# Patient Record
Sex: FEMALE | Race: WHITE | URBAN - METROPOLITAN AREA
[De-identification: names, ages, dates, MRNs, and addresses within clinical notes are randomized per-mention and may not be internally consistent; named-entity substitution may affect disease eponyms.]

---

## 2018-03-13 ENCOUNTER — OUTPATIENT (OUTPATIENT)
Dept: OUTPATIENT SERVICES | Facility: HOSPITAL | Age: 53
LOS: 1 days | Discharge: HOME | End: 2018-03-13

## 2018-03-13 DIAGNOSIS — Z12.31 ENCOUNTER FOR SCREENING MAMMOGRAM FOR MALIGNANT NEOPLASM OF BREAST: ICD-10-CM

## 2019-06-12 PROBLEM — Z00.00 ENCOUNTER FOR PREVENTIVE HEALTH EXAMINATION: Status: ACTIVE | Noted: 2019-06-12

## 2019-06-27 ENCOUNTER — RECORD ABSTRACTING (OUTPATIENT)
Age: 54
End: 2019-06-27

## 2019-06-27 ENCOUNTER — APPOINTMENT (OUTPATIENT)
Dept: OBGYN | Facility: CLINIC | Age: 54
End: 2019-06-27
Payer: COMMERCIAL

## 2019-06-27 VITALS
DIASTOLIC BLOOD PRESSURE: 80 MMHG | SYSTOLIC BLOOD PRESSURE: 130 MMHG | HEIGHT: 62 IN | WEIGHT: 160 LBS | BODY MASS INDEX: 29.44 KG/M2

## 2019-06-27 DIAGNOSIS — Z80.3 FAMILY HISTORY OF MALIGNANT NEOPLASM OF BREAST: ICD-10-CM

## 2019-06-27 DIAGNOSIS — R23.2 FLUSHING: ICD-10-CM

## 2019-06-27 DIAGNOSIS — Z87.42 PERSONAL HISTORY OF OTHER DISEASES OF THE FEMALE GENITAL TRACT: ICD-10-CM

## 2019-06-27 DIAGNOSIS — Z78.9 OTHER SPECIFIED HEALTH STATUS: ICD-10-CM

## 2019-06-27 DIAGNOSIS — N95.1 MENOPAUSAL AND FEMALE CLIMACTERIC STATES: ICD-10-CM

## 2019-06-27 LAB — CYTOLOGY CVX/VAG DOC THIN PREP: NORMAL

## 2019-06-27 PROCEDURE — 99396 PREV VISIT EST AGE 40-64: CPT | Mod: 25

## 2019-06-27 PROCEDURE — 99213 OFFICE O/P EST LOW 20 MIN: CPT

## 2019-07-03 LAB — CYTOLOGY CVX/VAG DOC THIN PREP: NORMAL

## 2019-07-17 ENCOUNTER — FORM ENCOUNTER (OUTPATIENT)
Age: 54
End: 2019-07-17

## 2019-07-18 ENCOUNTER — OUTPATIENT (OUTPATIENT)
Dept: OUTPATIENT SERVICES | Facility: HOSPITAL | Age: 54
LOS: 1 days | Discharge: HOME | End: 2019-07-18
Payer: COMMERCIAL

## 2019-07-18 DIAGNOSIS — R92.8 OTHER ABNORMAL AND INCONCLUSIVE FINDINGS ON DIAGNOSTIC IMAGING OF BREAST: ICD-10-CM

## 2019-07-18 PROCEDURE — G0279: CPT | Mod: 26

## 2019-07-18 PROCEDURE — 77066 DX MAMMO INCL CAD BI: CPT | Mod: 26

## 2019-07-18 PROCEDURE — 76642 ULTRASOUND BREAST LIMITED: CPT | Mod: 26,RT

## 2019-07-26 ENCOUNTER — APPOINTMENT (OUTPATIENT)
Dept: OBGYN | Facility: CLINIC | Age: 54
End: 2019-07-26
Payer: COMMERCIAL

## 2019-07-26 VITALS — DIASTOLIC BLOOD PRESSURE: 82 MMHG | SYSTOLIC BLOOD PRESSURE: 130 MMHG | WEIGHT: 160 LBS | BODY MASS INDEX: 29.26 KG/M2

## 2019-07-26 DIAGNOSIS — R97.0 ELEVATED CARCINOEMBRYONIC ANTIGEN [CEA]: ICD-10-CM

## 2019-07-26 DIAGNOSIS — Z86.018 PERSONAL HISTORY OF OTHER BENIGN NEOPLASM: ICD-10-CM

## 2019-07-26 PROCEDURE — 99213 OFFICE O/P EST LOW 20 MIN: CPT

## 2019-08-02 ENCOUNTER — APPOINTMENT (OUTPATIENT)
Dept: GYNECOLOGIC ONCOLOGY | Facility: CLINIC | Age: 54
End: 2019-08-02

## 2019-08-27 ENCOUNTER — APPOINTMENT (OUTPATIENT)
Dept: GYNECOLOGIC ONCOLOGY | Facility: CLINIC | Age: 54
End: 2019-08-27
Payer: COMMERCIAL

## 2019-08-27 VITALS
HEIGHT: 62 IN | BODY MASS INDEX: 29.44 KG/M2 | TEMPERATURE: 98.6 F | SYSTOLIC BLOOD PRESSURE: 118 MMHG | DIASTOLIC BLOOD PRESSURE: 78 MMHG | WEIGHT: 160 LBS

## 2019-08-27 DIAGNOSIS — R97.0 ELEVATED CARCINOEMBRYONIC ANTIGEN [CEA]: ICD-10-CM

## 2019-08-27 DIAGNOSIS — N83.201 UNSPECIFIED OVARIAN CYST, RIGHT SIDE: ICD-10-CM

## 2019-08-27 DIAGNOSIS — M79.629 PAIN IN UNSPECIFIED UPPER ARM: ICD-10-CM

## 2019-08-27 DIAGNOSIS — N83.291 OTHER OVARIAN CYST, RIGHT SIDE: ICD-10-CM

## 2019-08-27 DIAGNOSIS — Z72.0 TOBACCO USE: ICD-10-CM

## 2019-08-27 DIAGNOSIS — N83.202 UNSPECIFIED OVARIAN CYST, LEFT SIDE: ICD-10-CM

## 2019-08-27 DIAGNOSIS — Z87.42 PERSONAL HISTORY OF OTHER DISEASES OF THE FEMALE GENITAL TRACT: ICD-10-CM

## 2019-08-27 DIAGNOSIS — Z86.018 PERSONAL HISTORY OF OTHER BENIGN NEOPLASM: ICD-10-CM

## 2019-08-27 DIAGNOSIS — Z87.891 PERSONAL HISTORY OF NICOTINE DEPENDENCE: ICD-10-CM

## 2019-08-27 DIAGNOSIS — N83.292 OTHER OVARIAN CYST, LEFT SIDE: ICD-10-CM

## 2019-08-27 DIAGNOSIS — Z80.41 FAMILY HISTORY OF MALIGNANT NEOPLASM OF OVARY: ICD-10-CM

## 2019-08-27 DIAGNOSIS — N94.6 DYSMENORRHEA, UNSPECIFIED: ICD-10-CM

## 2019-08-27 PROCEDURE — 99204 OFFICE O/P NEW MOD 45 MIN: CPT

## 2019-08-27 NOTE — OB HISTORY
[Total Preg ___] : : [unfilled] [Full Term ___] : [unfilled] (full-term) [Living ___] : [unfilled] (living) [ ___] : [unfilled]  section delivery(s) [Definite ___ (Date)] : the last menstrual period was [unfilled] [Menarche Age ____] : age at menarche was [unfilled] [Regular Cycle Intervals] : periods have been irregular

## 2019-08-27 NOTE — HISTORY OF PRESENT ILLNESS
[FreeTextEntry1] : 53-year-old female for  presents with complaints of dysmenorrhea and menorrhagia. Patient is with previous 4  section and states that her periods have been progressively heavier and more painful. She reports a pain score of 5-6/10.  The patient is contemplating definitive surgical management.\par \par

## 2019-08-27 NOTE — DISCUSSION/SUMMARY
[FreeTextEntry1] : 53-year-old female for  presents with complaints of dysmenorrhea and menorrhagia. Patient is with previous 4  section and states that her periods have been progressively heavier and more painful. She reports a pain score of 5-6/10. \par \par Options for surgical management discussed. Procedures offered patient included laparoscopic hysterectomy along with possible bilateral salpingo-oophorectomy vs a repeat D&C by Dr. Garcia.\par JEN ZAVALA will think about her options and contact me with her decision.\par \par The risk benefits and alternative to the recommended treatments were discussed. She was informed about potential complications of surgery including but not limited to bowel, bladder, and ureteral injuries. Infectious morbidity, along with bleeding and thromboembolic events were also discussed.  \par She showed clear understanding, was given the opportunity to ask questions and is amenable to the above surgical treatment.\par \par \par

## 2019-08-27 NOTE — PHYSICAL EXAM
[Abnormal] : Uterus: Abnormal [Normal] : Recto-Vaginal Exam: Normal [Fully active, able to carry on all pre-disease performance without restriction] : Status 0 - Fully active, able to carry on all pre-disease performance without restriction [de-identified] : 14 weeks tender to palpation

## 2020-01-27 ENCOUNTER — APPOINTMENT (OUTPATIENT)
Dept: OBGYN | Facility: CLINIC | Age: 55
End: 2020-01-27
Payer: COMMERCIAL

## 2020-01-27 VITALS — BODY MASS INDEX: 30 KG/M2 | DIASTOLIC BLOOD PRESSURE: 82 MMHG | WEIGHT: 164 LBS | SYSTOLIC BLOOD PRESSURE: 130 MMHG

## 2020-01-27 DIAGNOSIS — R97.0 ELEVATED CARCINOEMBRYONIC ANTIGEN [CEA]: ICD-10-CM

## 2020-01-27 DIAGNOSIS — N83.201 UNSPECIFIED OVARIAN CYST, RIGHT SIDE: ICD-10-CM

## 2020-01-27 DIAGNOSIS — N92.1 EXCESSIVE AND FREQUENT MENSTRUATION WITH IRREGULAR CYCLE: ICD-10-CM

## 2020-01-27 PROCEDURE — 99213 OFFICE O/P EST LOW 20 MIN: CPT

## 2020-02-13 ENCOUNTER — APPOINTMENT (OUTPATIENT)
Dept: GYNECOLOGIC ONCOLOGY | Facility: HOSPITAL | Age: 55
End: 2020-02-13

## 2020-10-15 ENCOUNTER — OUTPATIENT (OUTPATIENT)
Dept: OUTPATIENT SERVICES | Facility: HOSPITAL | Age: 55
LOS: 1 days | Discharge: HOME | End: 2020-10-15
Payer: COMMERCIAL

## 2020-10-15 ENCOUNTER — RESULT REVIEW (OUTPATIENT)
Age: 55
End: 2020-10-15

## 2020-10-15 VITALS
WEIGHT: 169.76 LBS | TEMPERATURE: 98 F | DIASTOLIC BLOOD PRESSURE: 74 MMHG | RESPIRATION RATE: 16 BRPM | SYSTOLIC BLOOD PRESSURE: 142 MMHG | OXYGEN SATURATION: 100 % | HEIGHT: 62 IN | HEART RATE: 84 BPM

## 2020-10-15 DIAGNOSIS — Z01.818 ENCOUNTER FOR OTHER PREPROCEDURAL EXAMINATION: ICD-10-CM

## 2020-10-15 DIAGNOSIS — Z98.891 HISTORY OF UTERINE SCAR FROM PREVIOUS SURGERY: Chronic | ICD-10-CM

## 2020-10-15 DIAGNOSIS — Z98.890 OTHER SPECIFIED POSTPROCEDURAL STATES: Chronic | ICD-10-CM

## 2020-10-15 LAB
ALBUMIN SERPL ELPH-MCNC: 4.7 G/DL — SIGNIFICANT CHANGE UP (ref 3.5–5.2)
ALP SERPL-CCNC: 49 U/L — SIGNIFICANT CHANGE UP (ref 30–115)
ALT FLD-CCNC: 26 U/L — SIGNIFICANT CHANGE UP (ref 0–41)
ANION GAP SERPL CALC-SCNC: 12 MMOL/L — SIGNIFICANT CHANGE UP (ref 7–14)
APPEARANCE UR: CLEAR — SIGNIFICANT CHANGE UP
APTT BLD: 32.4 SEC — SIGNIFICANT CHANGE UP (ref 27–39.2)
AST SERPL-CCNC: 23 U/L — SIGNIFICANT CHANGE UP (ref 0–41)
BASOPHILS # BLD AUTO: 0.02 K/UL — SIGNIFICANT CHANGE UP (ref 0–0.2)
BASOPHILS NFR BLD AUTO: 0.4 % — SIGNIFICANT CHANGE UP (ref 0–1)
BILIRUB SERPL-MCNC: 0.4 MG/DL — SIGNIFICANT CHANGE UP (ref 0.2–1.2)
BILIRUB UR-MCNC: NEGATIVE — SIGNIFICANT CHANGE UP
BLD GP AB SCN SERPL QL: SIGNIFICANT CHANGE UP
BUN SERPL-MCNC: 8 MG/DL — LOW (ref 10–20)
CALCIUM SERPL-MCNC: 9.7 MG/DL — SIGNIFICANT CHANGE UP (ref 8.5–10.1)
CHLORIDE SERPL-SCNC: 101 MMOL/L — SIGNIFICANT CHANGE UP (ref 98–110)
CO2 SERPL-SCNC: 24 MMOL/L — SIGNIFICANT CHANGE UP (ref 17–32)
COLOR SPEC: COLORLESS — SIGNIFICANT CHANGE UP
CREAT SERPL-MCNC: 0.8 MG/DL — SIGNIFICANT CHANGE UP (ref 0.7–1.5)
DIFF PNL FLD: NEGATIVE — SIGNIFICANT CHANGE UP
EOSINOPHIL # BLD AUTO: 0.1 K/UL — SIGNIFICANT CHANGE UP (ref 0–0.7)
EOSINOPHIL NFR BLD AUTO: 2 % — SIGNIFICANT CHANGE UP (ref 0–8)
GLUCOSE SERPL-MCNC: 79 MG/DL — SIGNIFICANT CHANGE UP (ref 70–99)
GLUCOSE UR QL: NEGATIVE — SIGNIFICANT CHANGE UP
HCT VFR BLD CALC: 40.8 % — SIGNIFICANT CHANGE UP (ref 37–47)
HGB BLD-MCNC: 13.6 G/DL — SIGNIFICANT CHANGE UP (ref 12–16)
IMM GRANULOCYTES NFR BLD AUTO: 0.2 % — SIGNIFICANT CHANGE UP (ref 0.1–0.3)
INR BLD: 0.89 RATIO — SIGNIFICANT CHANGE UP (ref 0.65–1.3)
KETONES UR-MCNC: NEGATIVE — SIGNIFICANT CHANGE UP
LEUKOCYTE ESTERASE UR-ACNC: NEGATIVE — SIGNIFICANT CHANGE UP
LYMPHOCYTES # BLD AUTO: 2 K/UL — SIGNIFICANT CHANGE UP (ref 1.2–3.4)
LYMPHOCYTES # BLD AUTO: 40.3 % — SIGNIFICANT CHANGE UP (ref 20.5–51.1)
MCHC RBC-ENTMCNC: 30.5 PG — SIGNIFICANT CHANGE UP (ref 27–31)
MCHC RBC-ENTMCNC: 33.3 G/DL — SIGNIFICANT CHANGE UP (ref 32–37)
MCV RBC AUTO: 91.5 FL — SIGNIFICANT CHANGE UP (ref 81–99)
MONOCYTES # BLD AUTO: 0.35 K/UL — SIGNIFICANT CHANGE UP (ref 0.1–0.6)
MONOCYTES NFR BLD AUTO: 7.1 % — SIGNIFICANT CHANGE UP (ref 1.7–9.3)
NEUTROPHILS # BLD AUTO: 2.48 K/UL — SIGNIFICANT CHANGE UP (ref 1.4–6.5)
NEUTROPHILS NFR BLD AUTO: 50 % — SIGNIFICANT CHANGE UP (ref 42.2–75.2)
NITRITE UR-MCNC: NEGATIVE — SIGNIFICANT CHANGE UP
NRBC # BLD: 0 /100 WBCS — SIGNIFICANT CHANGE UP (ref 0–0)
PH UR: 6 — SIGNIFICANT CHANGE UP (ref 5–8)
PLATELET # BLD AUTO: 219 K/UL — SIGNIFICANT CHANGE UP (ref 130–400)
POTASSIUM SERPL-MCNC: 3.8 MMOL/L — SIGNIFICANT CHANGE UP (ref 3.5–5)
POTASSIUM SERPL-SCNC: 3.8 MMOL/L — SIGNIFICANT CHANGE UP (ref 3.5–5)
PROT SERPL-MCNC: 7.1 G/DL — SIGNIFICANT CHANGE UP (ref 6–8)
PROT UR-MCNC: NEGATIVE — SIGNIFICANT CHANGE UP
PROTHROM AB SERPL-ACNC: 10.2 SEC — SIGNIFICANT CHANGE UP (ref 9.95–12.87)
RBC # BLD: 4.46 M/UL — SIGNIFICANT CHANGE UP (ref 4.2–5.4)
RBC # FLD: 12.4 % — SIGNIFICANT CHANGE UP (ref 11.5–14.5)
SODIUM SERPL-SCNC: 137 MMOL/L — SIGNIFICANT CHANGE UP (ref 135–146)
SP GR SPEC: 1.01 — SIGNIFICANT CHANGE UP (ref 1.01–1.03)
UROBILINOGEN FLD QL: SIGNIFICANT CHANGE UP
WBC # BLD: 4.96 K/UL — SIGNIFICANT CHANGE UP (ref 4.8–10.8)
WBC # FLD AUTO: 4.96 K/UL — SIGNIFICANT CHANGE UP (ref 4.8–10.8)

## 2020-10-15 PROCEDURE — 93010 ELECTROCARDIOGRAM REPORT: CPT

## 2020-10-15 PROCEDURE — 71046 X-RAY EXAM CHEST 2 VIEWS: CPT | Mod: 26

## 2020-10-15 NOTE — H&P PST ADULT - HISTORY OF PRESENT ILLNESS
56 yo female presents for "hysterectomy, my uterus continues to grow over the past year, i have fibroids, cysts& heavy bleeding @ times" (postmenopausal- 9/23/2020)  pt is asymptomatic @ present time  denies chest pain, palpitations, shortness of breath, dyspnea, or dysuria. exercise tolerance: 5 blocks/ flights of stairs w/o sob  pt denies any known exposure to COVID-19, denies any S&S  pt instructed re: self quarantine guidelines    Anesthesia Alert  NO--Difficult Airway  NO--History of neck surgery or radiation  NO--Limited ROM of neck  NO--History of Malignant hyperthermia  NO--No personal or family history of Pseudocholinesterase deficiency.  NO--Prior Anesthesia Complication  NO--Latex Allergy  NO--Loose teeth  NO--History of Rheumatoid Arthritis  NO--EVELYNE  NO--Other_____

## 2020-10-15 NOTE — H&P PST ADULT - ATTENDING COMMENTS
55-year-old female for  presents with complaints of dysmenorrhea and menorrhagia. Patient is with previous 4  section and states that her periods have been progressively heavier and more painful. She reports a pain score of 5-6/10.     Options for surgical management discussed. Procedures offered patient included laparoscopic hysterectomy along with bilateral salpingo-oophorectomy vs a repeat D&C.   JEN ZAVALA requests definitive surgery and is aware that a supracervical  hysterectomy may be required secondary to adhesions from her 4 previous  sections.    The risk benefits and alternative to the recommended treatments were discussed. She was informed about potential complications of surgery including but not limited to bowel, bladder, and ureteral injuries. Infectious morbidity, along with bleeding and thromboembolic events were also discussed. She showed clear understanding, was given the opportunity to ask questions and is amenable to the above surgical treatment.

## 2020-10-16 DIAGNOSIS — N94.6 DYSMENORRHEA, UNSPECIFIED: ICD-10-CM

## 2020-10-16 DIAGNOSIS — N92.1 EXCESSIVE AND FREQUENT MENSTRUATION WITH IRREGULAR CYCLE: ICD-10-CM

## 2020-10-16 DIAGNOSIS — N83.202 UNSPECIFIED OVARIAN CYST, LEFT SIDE: ICD-10-CM

## 2020-10-20 ENCOUNTER — TRANSCRIPTION ENCOUNTER (OUTPATIENT)
Age: 55
End: 2020-10-20

## 2020-10-26 ENCOUNTER — OUTPATIENT (OUTPATIENT)
Dept: OUTPATIENT SERVICES | Facility: HOSPITAL | Age: 55
LOS: 1 days | Discharge: HOME | End: 2020-10-26

## 2020-10-26 DIAGNOSIS — Z11.59 ENCOUNTER FOR SCREENING FOR OTHER VIRAL DISEASES: ICD-10-CM

## 2020-10-26 DIAGNOSIS — Z98.890 OTHER SPECIFIED POSTPROCEDURAL STATES: Chronic | ICD-10-CM

## 2020-10-26 DIAGNOSIS — Z98.891 HISTORY OF UTERINE SCAR FROM PREVIOUS SURGERY: Chronic | ICD-10-CM

## 2020-10-26 PROBLEM — D25.9 LEIOMYOMA OF UTERUS, UNSPECIFIED: Chronic | Status: ACTIVE | Noted: 2020-10-15

## 2020-10-29 ENCOUNTER — APPOINTMENT (OUTPATIENT)
Dept: GYNECOLOGIC ONCOLOGY | Facility: HOSPITAL | Age: 55
End: 2020-10-29

## 2020-10-29 ENCOUNTER — INPATIENT (INPATIENT)
Facility: HOSPITAL | Age: 55
LOS: 0 days | Discharge: HOME | End: 2020-10-30
Attending: SPECIALIST | Admitting: SPECIALIST
Payer: COMMERCIAL

## 2020-10-29 ENCOUNTER — RESULT REVIEW (OUTPATIENT)
Age: 55
End: 2020-10-29

## 2020-10-29 ENCOUNTER — TRANSCRIPTION ENCOUNTER (OUTPATIENT)
Age: 55
End: 2020-10-29

## 2020-10-29 VITALS
SYSTOLIC BLOOD PRESSURE: 114 MMHG | WEIGHT: 164.91 LBS | HEART RATE: 88 BPM | HEIGHT: 62 IN | TEMPERATURE: 98 F | RESPIRATION RATE: 18 BRPM | DIASTOLIC BLOOD PRESSURE: 73 MMHG

## 2020-10-29 DIAGNOSIS — Z98.891 HISTORY OF UTERINE SCAR FROM PREVIOUS SURGERY: Chronic | ICD-10-CM

## 2020-10-29 DIAGNOSIS — Z98.890 OTHER SPECIFIED POSTPROCEDURAL STATES: Chronic | ICD-10-CM

## 2020-10-29 LAB — ABO RH CONFIRMATION: SIGNIFICANT CHANGE UP

## 2020-10-29 PROCEDURE — 88307 TISSUE EXAM BY PATHOLOGIST: CPT | Mod: 26

## 2020-10-29 PROCEDURE — 49585: CPT

## 2020-10-29 PROCEDURE — 58542 LSH W/T/O UT 250 G OR LESS: CPT

## 2020-10-29 PROCEDURE — 88302 TISSUE EXAM BY PATHOLOGIST: CPT | Mod: 26

## 2020-10-29 RX ORDER — SIMETHICONE 80 MG/1
80 TABLET, CHEWABLE ORAL EVERY 6 HOURS
Refills: 0 | Status: DISCONTINUED | OUTPATIENT
Start: 2020-10-29 | End: 2020-10-30

## 2020-10-29 RX ORDER — IBUPROFEN 200 MG
600 TABLET ORAL EVERY 6 HOURS
Refills: 0 | Status: DISCONTINUED | OUTPATIENT
Start: 2020-10-29 | End: 2020-10-30

## 2020-10-29 RX ORDER — OXYCODONE HYDROCHLORIDE 5 MG/1
1 TABLET ORAL
Qty: 14 | Refills: 0
Start: 2020-10-29

## 2020-10-29 RX ORDER — ACETAMINOPHEN 500 MG
975 TABLET ORAL EVERY 6 HOURS
Refills: 0 | Status: DISCONTINUED | OUTPATIENT
Start: 2020-10-29 | End: 2020-10-30

## 2020-10-29 RX ORDER — SODIUM CHLORIDE 9 MG/ML
1000 INJECTION, SOLUTION INTRAVENOUS
Refills: 0 | Status: DISCONTINUED | OUTPATIENT
Start: 2020-10-29 | End: 2020-10-29

## 2020-10-29 RX ORDER — ONDANSETRON 8 MG/1
4 TABLET, FILM COATED ORAL ONCE
Refills: 0 | Status: DISCONTINUED | OUTPATIENT
Start: 2020-10-29 | End: 2020-10-29

## 2020-10-29 RX ORDER — GABAPENTIN 400 MG/1
1 CAPSULE ORAL
Qty: 6 | Refills: 0
Start: 2020-10-29 | End: 2020-10-30

## 2020-10-29 RX ORDER — FAMOTIDINE 10 MG/ML
20 INJECTION INTRAVENOUS DAILY
Refills: 0 | Status: DISCONTINUED | OUTPATIENT
Start: 2020-10-29 | End: 2020-10-30

## 2020-10-29 RX ORDER — INFLUENZA VIRUS VACCINE 15; 15; 15; 15 UG/.5ML; UG/.5ML; UG/.5ML; UG/.5ML
0.5 SUSPENSION INTRAMUSCULAR ONCE
Refills: 0 | Status: DISCONTINUED | OUTPATIENT
Start: 2020-10-29 | End: 2020-10-30

## 2020-10-29 RX ORDER — ONDANSETRON 8 MG/1
4 TABLET, FILM COATED ORAL EVERY 6 HOURS
Refills: 0 | Status: DISCONTINUED | OUTPATIENT
Start: 2020-10-29 | End: 2020-10-30

## 2020-10-29 RX ORDER — OXYCODONE HYDROCHLORIDE 5 MG/1
5 TABLET ORAL EVERY 6 HOURS
Refills: 0 | Status: DISCONTINUED | OUTPATIENT
Start: 2020-10-29 | End: 2020-10-30

## 2020-10-29 RX ORDER — HYDROMORPHONE HYDROCHLORIDE 2 MG/ML
0.5 INJECTION INTRAMUSCULAR; INTRAVENOUS; SUBCUTANEOUS
Refills: 0 | Status: DISCONTINUED | OUTPATIENT
Start: 2020-10-29 | End: 2020-10-29

## 2020-10-29 RX ORDER — OXYCODONE AND ACETAMINOPHEN 5; 325 MG/1; MG/1
1 TABLET ORAL ONCE
Refills: 0 | Status: DISCONTINUED | OUTPATIENT
Start: 2020-10-29 | End: 2020-10-29

## 2020-10-29 RX ORDER — HYDROMORPHONE HYDROCHLORIDE 2 MG/ML
1 INJECTION INTRAMUSCULAR; INTRAVENOUS; SUBCUTANEOUS
Refills: 0 | Status: DISCONTINUED | OUTPATIENT
Start: 2020-10-29 | End: 2020-10-29

## 2020-10-29 RX ORDER — DOCUSATE SODIUM 100 MG
1 CAPSULE ORAL
Qty: 14 | Refills: 0
Start: 2020-10-29 | End: 2020-11-04

## 2020-10-29 RX ORDER — SIMETHICONE 80 MG/1
1 TABLET, CHEWABLE ORAL
Qty: 21 | Refills: 0
Start: 2020-10-29 | End: 2020-11-04

## 2020-10-29 RX ORDER — SODIUM CHLORIDE 9 MG/ML
1000 INJECTION, SOLUTION INTRAVENOUS
Refills: 0 | Status: DISCONTINUED | OUTPATIENT
Start: 2020-10-29 | End: 2020-10-30

## 2020-10-29 RX ORDER — IBUPROFEN 200 MG
1 TABLET ORAL
Qty: 28 | Refills: 0
Start: 2020-10-29 | End: 2020-11-04

## 2020-10-29 RX ORDER — GABAPENTIN 400 MG/1
300 CAPSULE ORAL EVERY 8 HOURS
Refills: 0 | Status: DISCONTINUED | OUTPATIENT
Start: 2020-10-29 | End: 2020-10-30

## 2020-10-29 RX ORDER — DEXAMETHASONE 0.5 MG/5ML
4 ELIXIR ORAL ONCE
Refills: 0 | Status: DISCONTINUED | OUTPATIENT
Start: 2020-10-29 | End: 2020-10-29

## 2020-10-29 RX ORDER — ACETAMINOPHEN 500 MG
2 TABLET ORAL
Qty: 56 | Refills: 0
Start: 2020-10-29 | End: 2020-11-04

## 2020-10-29 RX ADMIN — HYDROMORPHONE HYDROCHLORIDE 1 MILLIGRAM(S): 2 INJECTION INTRAMUSCULAR; INTRAVENOUS; SUBCUTANEOUS at 11:31

## 2020-10-29 RX ADMIN — Medication 600 MILLIGRAM(S): at 17:02

## 2020-10-29 RX ADMIN — SODIUM CHLORIDE 125 MILLILITER(S): 9 INJECTION, SOLUTION INTRAVENOUS at 11:09

## 2020-10-29 RX ADMIN — HYDROMORPHONE HYDROCHLORIDE 0.5 MILLIGRAM(S): 2 INJECTION INTRAMUSCULAR; INTRAVENOUS; SUBCUTANEOUS at 10:52

## 2020-10-29 RX ADMIN — Medication 975 MILLIGRAM(S): at 17:02

## 2020-10-29 RX ADMIN — SIMETHICONE 80 MILLIGRAM(S): 80 TABLET, CHEWABLE ORAL at 12:14

## 2020-10-29 RX ADMIN — Medication 975 MILLIGRAM(S): at 17:03

## 2020-10-29 RX ADMIN — OXYCODONE HYDROCHLORIDE 5 MILLIGRAM(S): 5 TABLET ORAL at 22:00

## 2020-10-29 RX ADMIN — Medication 5 MILLIGRAM(S): at 21:06

## 2020-10-29 RX ADMIN — Medication 975 MILLIGRAM(S): at 11:38

## 2020-10-29 RX ADMIN — GABAPENTIN 300 MILLIGRAM(S): 400 CAPSULE ORAL at 21:06

## 2020-10-29 RX ADMIN — FAMOTIDINE 20 MILLIGRAM(S): 10 INJECTION INTRAVENOUS at 12:14

## 2020-10-29 RX ADMIN — HYDROMORPHONE HYDROCHLORIDE 1 MILLIGRAM(S): 2 INJECTION INTRAMUSCULAR; INTRAVENOUS; SUBCUTANEOUS at 11:51

## 2020-10-29 RX ADMIN — SODIUM CHLORIDE 125 MILLILITER(S): 9 INJECTION, SOLUTION INTRAVENOUS at 13:59

## 2020-10-29 RX ADMIN — SIMETHICONE 80 MILLIGRAM(S): 80 TABLET, CHEWABLE ORAL at 17:02

## 2020-10-29 RX ADMIN — OXYCODONE HYDROCHLORIDE 5 MILLIGRAM(S): 5 TABLET ORAL at 21:04

## 2020-10-29 RX ADMIN — HYDROMORPHONE HYDROCHLORIDE 0.5 MILLIGRAM(S): 2 INJECTION INTRAMUSCULAR; INTRAVENOUS; SUBCUTANEOUS at 11:32

## 2020-10-29 RX ADMIN — OXYCODONE HYDROCHLORIDE 5 MILLIGRAM(S): 5 TABLET ORAL at 13:57

## 2020-10-29 RX ADMIN — GABAPENTIN 300 MILLIGRAM(S): 400 CAPSULE ORAL at 13:58

## 2020-10-29 RX ADMIN — Medication 600 MILLIGRAM(S): at 17:04

## 2020-10-29 NOTE — DISCHARGE NOTE PROVIDER - NSDCFUADDINST_GEN_ALL_CORE_FT
Nothing in the vagina for 6-8 weeks (no sex, no tampons, no douching, no swimming pools). Avoid tub baths, you may shower.    If you have a fever of 100.4F or greater, severe vaginal bleeding, or severe abdominal pain, call your Ob/Gyn or come to the emergency department immediately.

## 2020-10-29 NOTE — ASU DISCHARGE PLAN (ADULT/PEDIATRIC) - ACTIVITY LEVEL
Nothing per vagina/No tub baths/No tampons/No heavy lifting/No douching/No intercourse/for at least 6-8 weeks

## 2020-10-29 NOTE — PROGRESS NOTE ADULT - SUBJECTIVE AND OBJECTIVE BOX
PGY4 progress note    Patient seen and examined at bedside, resting comfortably. Pain well controlled. Ambulating and tolerating regular diet. Voided once, unsure of amount. No flatus yet. Denies fever, headache, n/v, chest pain, SOB, abdominal pain, leg pain, vaginal bleeding/discharge.     PE:  Vital Signs Last 24 Hrs  T(C): 36.1 (29 Oct 2020 14:04), Max: 36.8 (29 Oct 2020 06:43)  T(F): 97 (29 Oct 2020 14:04), Max: 98.3 (29 Oct 2020 10:44)  HR: 95 (29 Oct 2020 14:04) (78 - 95)  BP: 127/75 (29 Oct 2020 14:04) (114/73 - 135/81)  BP(mean): --  RR: 18 (29 Oct 2020 14:04) (12 - 24)  SpO2: 96% (29 Oct 2020 12:45) (96% - 100%)    GEN: NAD, AAOX3  CVS: RRR, normal S1/S2  lungs: CTAB  abd: soft, nontender, nondistended, no r/g/r  incisions: laparoscopic and exlap incisions c/d/i  ext: no calf tenderness or edema, SCDs in place  SVE: deferred, no blood on pad or chux    Preop labs:  preh/h 13.6/40.8; Cr 0.8    MEDICATIONS  (STANDING):  acetaminophen   Tablet .. 975 milliGRAM(s) Oral every 6 hours  bisacodyl 5 milliGRAM(s) Oral at bedtime  famotidine    Tablet 20 milliGRAM(s) Oral daily  gabapentin 300 milliGRAM(s) Oral every 8 hours  ibuprofen  Tablet. 600 milliGRAM(s) Oral every 6 hours  influenza   Vaccine 0.5 milliLiter(s) IntraMuscular once  lactated ringers. 1000 milliLiter(s) (125 mL/Hr) IV Continuous <Continuous>  simethicone 80 milliGRAM(s) Chew every 6 hours    MEDICATIONS  (PRN):  ondansetron Injectable 4 milliGRAM(s) IV Push every 6 hours PRN Nausea and/or Vomiting  oxyCODONE    IR 5 milliGRAM(s) Oral every 6 hours PRN Severe Pain (7 - 10)

## 2020-10-29 NOTE — PROGRESS NOTE ADULT - ASSESSMENT
56 y/o P4, no significant PMHx, s/p lap supracervical hysterectomy/BSO; INDERJIT, umbilical hernia repair, converted to mini exlap for removal of fibroid uterus, EBL 50cc, POD0, doing well, admitted for postop pain control.   - voided once, f/u second void. If no additional void will reinsert calhoun catheter  - regular diet  - vitals per routine  - encourage hydration and ambulation  - ERAS for pain management  - f/u AM labs  - SCDs for VTE prophylaxis    Will inform Dr. Rogers.

## 2020-10-29 NOTE — BRIEF OPERATIVE NOTE - NSICDXBRIEFPOSTOP_GEN_ALL_CORE_FT
POST-OP DIAGNOSIS:  Fibroid uterus 29-Oct-2020 10:42:38 with menorrhagia, dysmenorrhea Ashleigh Shultz

## 2020-10-29 NOTE — DISCHARGE NOTE PROVIDER - HOSPITAL COURSE
s/p scheduled laparoscopic supracervical hysterectomy with BSO, lysis of adhesions, repair of umbilical hernia. Pt admitted for pain control and observation in view of mini-lap incision. Postop, H/H stable; pt voiding, ambulating, tolerating regular diet; hence d/henrry home on POD1.

## 2020-10-29 NOTE — BRIEF OPERATIVE NOTE - NSICDXBRIEFPREOP_GEN_ALL_CORE_FT
PRE-OP DIAGNOSIS:  Fibroid uterus 29-Oct-2020 10:42:22 with menorrhagia, dysmenorrhea Ashleigh Shultz

## 2020-10-29 NOTE — DISCHARGE NOTE PROVIDER - CARE PROVIDERS DIRECT ADDRESSES
[email or web address]

## 2020-10-29 NOTE — DISCHARGE NOTE PROVIDER - CARE PROVIDER_API CALL
Edda Rogers  GYNECOLOGIC ONCOLOGY  [address] [place] [address], [address] [place] [address]  Phone: [phone]  Fax: [phone]  Established Patient  Follow Up Time: 2 weeks

## 2020-10-29 NOTE — ASU DISCHARGE PLAN (ADULT/PEDIATRIC) - CARE PROVIDER_API CALL
Edda Rogers  GYNECOLOGIC ONCOLOGY  [address] [place] [address], [address] [place] [address]  Phone: [phone]  Fax: [phone]  Established Patient  Follow Up Time: 2 weeks

## 2020-10-29 NOTE — DISCHARGE NOTE PROVIDER - NSDCMRMEDTOKEN_GEN_ALL_CORE_FT
Colace 100 mg oral capsule: 1 cap(s) orally 2 times a day   gabapentin 300 mg oral capsule: 1 cap(s) orally every 8 hours   ibuprofen 600 mg oral tablet: 1 tab(s) orally every 6 hours   oxyCODONE 5 mg oral tablet: 1 tab(s) orally every 6 hours MDD:4  simethicone 80 mg oral tablet, chewable: 1 tab(s) chewed every 8 hours   Tylenol 325 mg oral tablet: 2 tab(s) orally every 6 hours

## 2020-10-29 NOTE — CHART NOTE - NSCHARTNOTEFT_GEN_A_CORE
PACU ANESTHESIA ADMISSION NOTE      Procedure: Repair, hernia, umbilical, adult    Laparoscopic supracervical hysterectomy with salpingectomy for uterus 250 grams or less  and bilateral oophorectomy; lysis of adhesions      Post op diagnosis:  Fibroid uterus        ____  Intubated  TV:______       Rate: ______      FiO2: ______    __x__  Patent Airway    _x___  Full return of protective reflexes    __x__  Full recovery from anesthesia / back to baseline     Vitals:   T: 98.3          R:   10               BP: 134/69                 Sat:100%                   P: 98      Mental Status:  __x__ Awake   __x___ Alert   _____ Drowsy   _____ Sedated    Nausea/Vomiting:  __x__ NO  ______Yes,   See Post - Op Orders          Pain Scale (0-10):  __0___    Treatment: ____ None    __x__ See Post - Op/PCA Orders    Post - Operative Fluids:   ____ Oral   __x__ See Post - Op Orders    Plan: Discharge:   ____Home       __x___Floor     _____Critical Care    _____  Other:_________________    Comments: When parameters met.

## 2020-10-30 ENCOUNTER — TRANSCRIPTION ENCOUNTER (OUTPATIENT)
Age: 55
End: 2020-10-30

## 2020-10-30 VITALS
HEART RATE: 81 BPM | DIASTOLIC BLOOD PRESSURE: 75 MMHG | SYSTOLIC BLOOD PRESSURE: 134 MMHG | TEMPERATURE: 99 F | RESPIRATION RATE: 18 BRPM

## 2020-10-30 LAB
ANION GAP SERPL CALC-SCNC: 9 MMOL/L — SIGNIFICANT CHANGE UP (ref 7–14)
BASOPHILS # BLD AUTO: 0.01 K/UL — SIGNIFICANT CHANGE UP (ref 0–0.2)
BASOPHILS NFR BLD AUTO: 0.1 % — SIGNIFICANT CHANGE UP (ref 0–1)
BUN SERPL-MCNC: 5 MG/DL — LOW (ref 10–20)
CALCIUM SERPL-MCNC: 8.7 MG/DL — SIGNIFICANT CHANGE UP (ref 8.5–10.1)
CHLORIDE SERPL-SCNC: 106 MMOL/L — SIGNIFICANT CHANGE UP (ref 98–110)
CO2 SERPL-SCNC: 26 MMOL/L — SIGNIFICANT CHANGE UP (ref 17–32)
CREAT SERPL-MCNC: 0.8 MG/DL — SIGNIFICANT CHANGE UP (ref 0.7–1.5)
EOSINOPHIL # BLD AUTO: 0.04 K/UL — SIGNIFICANT CHANGE UP (ref 0–0.7)
EOSINOPHIL NFR BLD AUTO: 0.6 % — SIGNIFICANT CHANGE UP (ref 0–8)
GLUCOSE SERPL-MCNC: 97 MG/DL — SIGNIFICANT CHANGE UP (ref 70–99)
HCT VFR BLD CALC: 36.2 % — LOW (ref 37–47)
HGB BLD-MCNC: 11.8 G/DL — LOW (ref 12–16)
IMM GRANULOCYTES NFR BLD AUTO: 0.3 % — SIGNIFICANT CHANGE UP (ref 0.1–0.3)
LYMPHOCYTES # BLD AUTO: 2.04 K/UL — SIGNIFICANT CHANGE UP (ref 1.2–3.4)
LYMPHOCYTES # BLD AUTO: 28.8 % — SIGNIFICANT CHANGE UP (ref 20.5–51.1)
MAGNESIUM SERPL-MCNC: 2 MG/DL — SIGNIFICANT CHANGE UP (ref 1.8–2.4)
MCHC RBC-ENTMCNC: 30.6 PG — SIGNIFICANT CHANGE UP (ref 27–31)
MCHC RBC-ENTMCNC: 32.6 G/DL — SIGNIFICANT CHANGE UP (ref 32–37)
MCV RBC AUTO: 93.8 FL — SIGNIFICANT CHANGE UP (ref 81–99)
MONOCYTES # BLD AUTO: 0.74 K/UL — HIGH (ref 0.1–0.6)
MONOCYTES NFR BLD AUTO: 10.4 % — HIGH (ref 1.7–9.3)
NEUTROPHILS # BLD AUTO: 4.24 K/UL — SIGNIFICANT CHANGE UP (ref 1.4–6.5)
NEUTROPHILS NFR BLD AUTO: 59.8 % — SIGNIFICANT CHANGE UP (ref 42.2–75.2)
NRBC # BLD: 0 /100 WBCS — SIGNIFICANT CHANGE UP (ref 0–0)
PHOSPHATE SERPL-MCNC: 3.6 MG/DL — SIGNIFICANT CHANGE UP (ref 2.1–4.9)
PLATELET # BLD AUTO: 199 K/UL — SIGNIFICANT CHANGE UP (ref 130–400)
POTASSIUM SERPL-MCNC: 4.2 MMOL/L — SIGNIFICANT CHANGE UP (ref 3.5–5)
POTASSIUM SERPL-SCNC: 4.2 MMOL/L — SIGNIFICANT CHANGE UP (ref 3.5–5)
RBC # BLD: 3.86 M/UL — LOW (ref 4.2–5.4)
RBC # FLD: 12.5 % — SIGNIFICANT CHANGE UP (ref 11.5–14.5)
SODIUM SERPL-SCNC: 141 MMOL/L — SIGNIFICANT CHANGE UP (ref 135–146)
WBC # BLD: 7.09 K/UL — SIGNIFICANT CHANGE UP (ref 4.8–10.8)
WBC # FLD AUTO: 7.09 K/UL — SIGNIFICANT CHANGE UP (ref 4.8–10.8)

## 2020-10-30 RX ORDER — IBUPROFEN 200 MG
1 TABLET ORAL
Qty: 28 | Refills: 0
Start: 2020-10-30 | End: 2020-11-05

## 2020-10-30 RX ORDER — DOCUSATE SODIUM 100 MG
1 CAPSULE ORAL
Qty: 14 | Refills: 0
Start: 2020-10-30 | End: 2020-11-05

## 2020-10-30 RX ORDER — ACETAMINOPHEN 500 MG
2 TABLET ORAL
Qty: 56 | Refills: 0
Start: 2020-10-30 | End: 2020-11-05

## 2020-10-30 RX ORDER — SIMETHICONE 80 MG/1
1 TABLET, CHEWABLE ORAL
Qty: 21 | Refills: 0
Start: 2020-10-30 | End: 2020-11-05

## 2020-10-30 RX ORDER — OXYCODONE HYDROCHLORIDE 5 MG/1
1 TABLET ORAL
Qty: 14 | Refills: 0
Start: 2020-10-30

## 2020-10-30 RX ORDER — GABAPENTIN 400 MG/1
1 CAPSULE ORAL
Qty: 6 | Refills: 0
Start: 2020-10-30 | End: 2020-10-31

## 2020-10-30 RX ADMIN — Medication 975 MILLIGRAM(S): at 08:33

## 2020-10-30 RX ADMIN — Medication 975 MILLIGRAM(S): at 04:00

## 2020-10-30 RX ADMIN — Medication 600 MILLIGRAM(S): at 04:00

## 2020-10-30 RX ADMIN — Medication 975 MILLIGRAM(S): at 09:00

## 2020-10-30 RX ADMIN — SIMETHICONE 80 MILLIGRAM(S): 80 TABLET, CHEWABLE ORAL at 08:32

## 2020-10-30 RX ADMIN — Medication 600 MILLIGRAM(S): at 12:15

## 2020-10-30 RX ADMIN — Medication 600 MILLIGRAM(S): at 08:32

## 2020-10-30 RX ADMIN — Medication 975 MILLIGRAM(S): at 02:54

## 2020-10-30 RX ADMIN — OXYCODONE HYDROCHLORIDE 5 MILLIGRAM(S): 5 TABLET ORAL at 02:54

## 2020-10-30 RX ADMIN — Medication 600 MILLIGRAM(S): at 02:54

## 2020-10-30 RX ADMIN — OXYCODONE HYDROCHLORIDE 5 MILLIGRAM(S): 5 TABLET ORAL at 12:12

## 2020-10-30 RX ADMIN — SIMETHICONE 80 MILLIGRAM(S): 80 TABLET, CHEWABLE ORAL at 02:54

## 2020-10-30 RX ADMIN — GABAPENTIN 300 MILLIGRAM(S): 400 CAPSULE ORAL at 05:17

## 2020-10-30 RX ADMIN — OXYCODONE HYDROCHLORIDE 5 MILLIGRAM(S): 5 TABLET ORAL at 04:00

## 2020-10-30 RX ADMIN — Medication 600 MILLIGRAM(S): at 09:00

## 2020-10-30 NOTE — PROGRESS NOTE ADULT - SUBJECTIVE AND OBJECTIVE BOX
JEN ZAVALA  55y  Female    PGY4 Note:    Pt recovering well, no acute complaints. Pain well controlled; denies heavy vaginal bleeding/foul smelling vaginal discharge. Denies fever, chills, nausea/vomiting, diarrhea, dysuria, LE pain. Denies dizziness/lightheadedness/CP/SOB/palpitations.     Ambulating: Yes  Voiding: Yes  Flatus: No  Bowel movements: No   Diet: Regular    Physical Exam  Vital Signs Last 24 Hrs  T(C): 36.4 (30 Oct 2020 05:01), Max: 37.1 (29 Oct 2020 18:12)  T(F): 97.6 (30 Oct 2020 05:01), Max: 98.8 (29 Oct 2020 18:12)  HR: 83 (30 Oct 2020 05:01) (78 - 95)  BP: 121/63 (30 Oct 2020 05:01) (114/73 - 143/71)  RR: 18 (30 Oct 2020 05:01) (12 - 24)  SpO2: 96% (29 Oct 2020 12:45) (96% - 100%)    Gen: AAOx3, NAD  Heart: RRR, S1S2+  Lungs: CTA B/L, no r/r/w   Wound: dermabond in place- c/d/i   Abd: Soft, nontender, nondistended, BS+   Pelvic: no active bleeding   Ext: No calf tenderness, no swelling; SCDs in place     Labs: AM labs pending     MEDICATIONS  (STANDING):  acetaminophen   Tablet .. 975 milliGRAM(s) Oral every 6 hours  bisacodyl 5 milliGRAM(s) Oral at bedtime  famotidine    Tablet 20 milliGRAM(s) Oral daily  gabapentin 300 milliGRAM(s) Oral every 8 hours  ibuprofen  Tablet. 600 milliGRAM(s) Oral every 6 hours  influenza   Vaccine 0.5 milliLiter(s) IntraMuscular once  lactated ringers. 1000 milliLiter(s) (125 mL/Hr) IV Continuous <Continuous>  simethicone 80 milliGRAM(s) Chew every 6 hours    MEDICATIONS  (PRN):  ondansetron Injectable 4 milliGRAM(s) IV Push every 6 hours PRN Nausea and/or Vomiting  oxyCODONE    IR 5 milliGRAM(s) Oral every 6 hours PRN Severe Pain (7 - 10)

## 2020-10-30 NOTE — DISCHARGE NOTE NURSING/CASE MANAGEMENT/SOCIAL WORK - PATIENT PORTAL LINK FT
You can access the FollowMyHealth Patient Portal offered by [facility] by registering at the following website: [email or web address]. By joining [employer]’s FollowMyHealth portal, you will also be able to view your health information using other applications (apps) compatible with our system.

## 2020-10-30 NOTE — PROGRESS NOTE ADULT - ASSESSMENT
A/P: 56yo P4, no significant PMHx, s/p lap supracervical hysterectomy/BSO; INDERJIT, umbilical hernia repair; EBL 50cc, POD1, recovering well     - PO pain meds   - encourage ambulation, PO hydration  - f/u AM labs    - monitor vitals, bleeding   - regular diet  - encourage incentive spirometry   - simethicone/dulcolax   - routine postop care   - d/c home today pending AM labs     Will inform Dr. Rogers

## 2020-11-03 LAB — SURGICAL PATHOLOGY STUDY: SIGNIFICANT CHANGE UP

## 2020-11-04 DIAGNOSIS — N94.6 DYSMENORRHEA, UNSPECIFIED: ICD-10-CM

## 2020-11-04 DIAGNOSIS — K42.9 UMBILICAL HERNIA WITHOUT OBSTRUCTION OR GANGRENE: ICD-10-CM

## 2020-11-04 DIAGNOSIS — D25.9 LEIOMYOMA OF UTERUS, UNSPECIFIED: ICD-10-CM

## 2020-11-04 DIAGNOSIS — N73.6 FEMALE PELVIC PERITONEAL ADHESIONS (POSTINFECTIVE): ICD-10-CM

## 2020-11-04 DIAGNOSIS — N92.0 EXCESSIVE AND FREQUENT MENSTRUATION WITH REGULAR CYCLE: ICD-10-CM

## 2020-11-13 ENCOUNTER — APPOINTMENT (OUTPATIENT)
Dept: GYNECOLOGIC ONCOLOGY | Facility: CLINIC | Age: 55
End: 2020-11-13
Payer: COMMERCIAL

## 2020-11-13 VITALS
BODY MASS INDEX: 30.18 KG/M2 | WEIGHT: 164 LBS | SYSTOLIC BLOOD PRESSURE: 130 MMHG | HEIGHT: 62 IN | TEMPERATURE: 97.3 F | DIASTOLIC BLOOD PRESSURE: 80 MMHG

## 2020-11-13 DIAGNOSIS — Z90.721 ACQUIRED ABSENCE OF BOTH CERVIX AND UTERUS: ICD-10-CM

## 2020-11-13 DIAGNOSIS — Z86.018 PERSONAL HISTORY OF OTHER BENIGN NEOPLASM: ICD-10-CM

## 2020-11-13 DIAGNOSIS — Z90.710 ACQUIRED ABSENCE OF BOTH CERVIX AND UTERUS: ICD-10-CM

## 2020-11-13 PROCEDURE — 99024 POSTOP FOLLOW-UP VISIT: CPT

## 2020-11-13 NOTE — DISCUSSION/SUMMARY
[Clean] : was clean [Dry] : was dry [Intact] : was intact [None] : had no drainage [Doing Well] : is doing well [Erythema] : was not erythematous [Ecchymosis] : was not ecchymotic [Seroma] : had no seroma [Firm] : soft [Tender] : nontender [Abnormal Bowel Sounds] : normal bowel sounds [Rebound] : no rebound tenderness [Guarding] : no guarding [Incisional Hernia] : no incisional hernia [Mass] : no palpable mass [External Genitalia Abnormal] : normal external genitalia [Vaginal Exam Abnormal] : normal vaginal exam

## 2020-11-13 NOTE — ASSESSMENT
[FreeTextEntry1] : 55-year-old female  with a history of dysmenorrhea and menorrhagia. Patient is with previous 4  section and states that her periods had been progressively heavier and more painful. She reported a pain score of 5-6/10. The patient is s/p definitive surgical management with a LACSCHYS/BSO 10/29/20 with benign pathology.\par The patient appears to be recovering well. She complains of severe hot flashes secondary to menopause and is requesting definitive treatment. RBA of HRT Discussed and she was started on Climara.

## 2020-11-13 NOTE — REASON FOR VISIT
[Post Op] : post op visit [de-identified] : October 29, 2020 [de-identified] : TLASCH/BSO and umbilical hernia repair [de-identified] : 55 year old patient of Dr. Garcia, s/p TLH/BSO and repair of umbilical hernia on 10/29/20.   \par Benign pathology\par Final Diagnosis\par 1. Umbilical hernia sac:\par - Fibroadipose tissue consistent with hernia sac.\par \par 2. Uterus, bilateral tubes and ovaries:\par - Endometrial polyp\par - Endometrium, proliferative - like, focally disordered\par - Adenomyosis / "Adenomyoma"\par - Leiomyomata\par - Ovaries and fallopian tubes, bilateral\par - Ovary, luteinized follicular cyst, corpora albicantia\par - Paratubal cysts\par - No malignancy is seen in this specimen.\par \par Verified by: Levar Becker MD\par \par

## 2021-01-28 ENCOUNTER — APPOINTMENT (OUTPATIENT)
Dept: OBGYN | Facility: CLINIC | Age: 56
End: 2021-01-28

## 2021-01-28 DIAGNOSIS — Z79.890 HORMONE REPLACEMENT THERAPY: ICD-10-CM

## 2021-02-12 DIAGNOSIS — B37.3 CANDIDIASIS OF VULVA AND VAGINA: ICD-10-CM

## 2021-03-01 ENCOUNTER — APPOINTMENT (OUTPATIENT)
Dept: OBGYN | Facility: CLINIC | Age: 56
End: 2021-03-01

## 2021-03-05 NOTE — PATIENT PROFILE ADULT - NSPROPTRIGHTSUPPORTPERSON_GEN_A_NUR
[facility] Dermat[facility]  Karoline Harada, MD  [phone]      Winsome Boutte  1936    81 y.o. male     Date of Visit: 3/5/2021    Chief Complaint: skin lesions    History of Present Illness:    1. He returns today to follow-up for history of actinic keratoses. He had a hypertrophic actinic keratosis on the right lateral cheektreated with Efudex cream twice daily for 2 weeks with success. He complains of few other new lesions on the right ear and chest.  He also complains of several scaly lesions on the upper arms. 2.  He complains of multiple growths on the back and shoulders. 3.  He has a history of multiple nonmelanoma skin cancersdenies any signs of recurrence. 4.  He also has a history of 2 melanomasdenies any signs of recurrence. 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 Eloina Mckenzie (stage IB) on 12/10/2014.     Superficial spreading melanoma of the left mid extensor forearm, 0.5 mm in depth, status post wide local excision by Dr. Jeovanny Jones on 2/25/14 (stage IA).    Dermatologic history:  SCC in situ of the left upper armtreated with curettage on 10/2/2020. 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 Small squamous cell carcinoma the right superior shouldertreated with curettage on 5/10/2017. SCC in situ of the left lateral neckED with curettage on 1/20/2017. SCC on the left upper backexcised on 1/20/2017. SCC in situ of the right forearmtreated with curettage on 3/4/2016. SCC in situ of the left upper backtreated with curettage on 8/27/2015. SCC of the central chest - excised 3/27/15. BCC of the left forearm - excised on 2/25/14.     Has a pacemaker. Review of Systems:  Gen: Feels well, good sense of health. Past Medical History, Family History, Surgical History, Medications and Allergies reviewed. Past Medical History:   Diagnosis Date    Arrhythmia     Arthritis     hands shoulders neck    Atrial fibrillation (HCC)     coumadin    Atrial tachycardia (HCC)     CAD (coronary artery disease)     Cancer (HCC)     skin    Diabetes mellitus ([facility] [address]     Diverticulitis     Enlarged prostate     Hyperlipidemia     Hypertension     Pacemaker 04/20/2020    Pneumonia     ABOUT 5 YEARS AGO    Vasodepressor syncope      Past Surgical History:   Procedure Laterality Date    ABLATION OF DYSRHYTHMIC FOCUS      AVNRT and Atrial tachycardia    CARPAL TUNNEL RELEASE      CATARACT REMOVAL Bilateral     CORONARY ANGIOPLASTY WITH STENT PLACEMENT  2005    CYSTOSCOPY  9/26/12    coagulation prostatic urethra    CYSTOSCOPY  10/8/15    TURP    FINGER SURGERY      X7    FINGER TRIGGER RELEASE      OTHER SURGICAL HISTORY Left [id number]    WIDE LOCAL EXCISION LEFT ARM MELANOMA, WIDE LOCAL EXCISION OF    SHOULDER SURGERY Bilateral     SPINAL FUSION      TURP  3-7-13       No Known Allergies  Outpatient Medications Marked as Taking for the 3/5/21 encounter (Office Visit) with Essence Barajas MD   Medication Sig Dispense Refill    sotalol (BETAPACE) 80 MG tablet TAKE 1 TABLET BY MOUTH TWICE A  tablet 3    valACYclovir (VALTREX) 500 MG tablet TAKE 1 TABLET BY MOUTH TWICE DAILY FOR 3 DAYS AT FIRST SYMPTOMS OF RECURRENCE ON THE BUTTOCK 24 tablet 0    triamcinolone (KENALOG) 0.1 % cream Apply to itchy areas on the arms and legs twice daily for up to 2 weeks or until improved. 80 g 2    fluorouracil (EFUDEX) 5 % cream Apply twice daily to affected area on the right cheek for 2 weeks.  40 g 0    apixaban (ELIQUIS) 5 MG TABS tablet Take 1 tablet by mouth 2 times daily 60 tablet 5    nateglinide (STARLIX) 120 MG tablet Take 1 tablet by mouth 2 times daily (before meals) 180 tablet 3  simvastatin (ZOCOR) 40 MG tablet Take 1 tablet by mouth daily 180 tablet 3    omega-3 acid ethyl esters (LOVAZA) 1 g capsule Take 1 capsule by mouth 2 times daily 180 capsule 3    SITagliptin (JANUVIA) 50 MG tablet Take 1 tablet by mouth daily 90 tablet 3    insulin detemir (LEVEMIR FLEXTOUCH) 100 UNIT/ML injection pen Inject 24 Units into the skin nightly 10 pen 3    amLODIPine (NORVASC) 5 MG tablet Take 1 tablet by mouth daily 90 tablet 3    Insulin Pen Needle (B-D UF III MINI PEN NEEDLES) 31G X 5 MM MISC Use as directed 200 each 2    Loratadine (CLARITIN PO) Take by mouth Indications: Couple times a week      vitamin D (ERGOCALCIFEROL) 1.25 MG (42433 UT) CAPS capsule Take 50,000 Units by mouth once a week Mondays      ferrous sulfate ([employer] IRON) 325 (65 FE) MG tablet Take 325 mg by mouth daily (with breakfast) Indications: twice a week, Tuesday and Thursday       Ascorbic Acid (VITAMIN C) 500 MG CAPS Take by mouth nightly Indications: Three times a week  Monday, Wednesday, Friday       Multiple Vitamins-Minerals (CENTRUM SILVER) TABS Take 1 tablet by mouth daily       ALPHA LIPOIC ACID PO Take 100 mg by mouth daily            Physical Examination       The following were examined and determined to be normal: Psych/Neuro, Scalp/hair, Conjunctivae/eyelids, Gums/teeth/lips, Neck, Abdomen, Back, RLE, LLE and Nails/digits. The following were examined and determined to be abnormal: Head/face, Breast/axilla/chest, RUE and LUE. Well appearing. 1.  Right lateral cheek - clear. Right superior antihelix - 1, right superior helix - 1, right upper chest - 2: ill defined irreg shaped keratotic pink macules. Left lateral lower cheek, right upper arm, left upper arm - ill defined scaly pink patches. 2.  Shoulders, back with stuck-on appearing tan-brown verrucous papules and plaques. 3.  Clear. 4.  Clear. Assessment and Plan     1.  AK (actinic keratosis) - several Efudex cream twice daily to the lesions on the upper arms for 14 days. We discussed the likely side effects of treatment including redness, crusting, itching and burning. 2 cycles of liquid nitrogen applied to 4 AKs: Right superior antihelix - 1, right superior helix - 1, right upper chest - 2. Patient was educated regarding the potential risks of blister formation, discomfort, hypopigmentation, and scar. Wound care was discussed. 2. SK (seborrheic keratosis)     Reassurance. 3. History of nonmelanoma skin cancers - clear    Sun protective behaviors, including use of at least SPF 30 sunscreen, and self skin examinations were encouraged. Call for any new or concerning lesions. 4. History of melanoma x 2 (left earlobe (1B) and left forearm (1A)) -no signs of local recurrence. More than 5 years from diagnosis and treatment. Continue self skin examinations. Continue sun protective behaviors including use of at least SPF 30 sunscreen. Return for full skin examination in 6 months. No follow-ups on file.     --Genesis Moctezuma MD declines

## 2021-03-23 ENCOUNTER — APPOINTMENT (OUTPATIENT)
Dept: OBGYN | Facility: CLINIC | Age: 56
End: 2021-03-23
Payer: COMMERCIAL

## 2021-03-23 VITALS
WEIGHT: 173 LBS | BODY MASS INDEX: 40.04 KG/M2 | DIASTOLIC BLOOD PRESSURE: 68 MMHG | HEIGHT: 55 IN | SYSTOLIC BLOOD PRESSURE: 122 MMHG | TEMPERATURE: 98 F

## 2021-03-23 DIAGNOSIS — Z01.411 ENCOUNTER FOR GYNECOLOGICAL EXAMINATION (GENERAL) (ROUTINE) WITH ABNORMAL FINDINGS: ICD-10-CM

## 2021-03-23 DIAGNOSIS — N89.8 OTHER SPECIFIED NONINFLAMMATORY DISORDERS OF VAGINA: ICD-10-CM

## 2021-03-23 PROCEDURE — 99396 PREV VISIT EST AGE 40-64: CPT

## 2021-03-23 PROCEDURE — 99213 OFFICE O/P EST LOW 20 MIN: CPT | Mod: 25

## 2021-03-23 PROCEDURE — 81002 URINALYSIS NONAUTO W/O SCOPE: CPT

## 2021-03-23 PROCEDURE — 99072 ADDL SUPL MATRL&STAF TM PHE: CPT

## 2021-03-23 NOTE — PHYSICAL EXAM
[Appropriately responsive] : appropriately responsive [Alert] : alert [No Acute Distress] : no acute distress [No Lymphadenopathy] : no lymphadenopathy [Regular Rate Rhythm] : regular rate rhythm [No Murmurs] : no murmurs [Clear to Auscultation B/L] : clear to auscultation bilaterally [Soft] : soft [Non-tender] : non-tender [Non-distended] : non-distended [No HSM] : No HSM [No Lesions] : no lesions [No Mass] : no mass [Oriented x3] : oriented x3 [Examination Of The Breasts] : a normal appearance [No Masses] : no breast masses were palpable [Labia Majora] : normal [Labia Minora] : normal [Atrophy] : atrophy [Normal] : normal [Absent] : absent [Uterine Adnexae] : absent

## 2021-03-23 NOTE — DISCUSSION/SUMMARY
[FreeTextEntry1] : Pap done\par Self breast exam stressed\par Prescribed yearly bilateral screening mammogram\par Increase dosage of Climara to 0.0375 mg/day\par Follow-up 6 months or as needed

## 2021-03-23 NOTE — HISTORY OF PRESENT ILLNESS
[FreeTextEntry1] : Patient is 55 years old para 4-0-2-4 last menstrual period November 2019\par She is status post laparoscopic supracervical hysterectomy bilateral salpingo-oophorectomy.\par She is presently on hormone replacement therapy in the form of Climara 0.025 mg/day and is complaining of hot flashes, insomnia, vaginal dryness, and decreased libido.

## 2021-05-13 ENCOUNTER — APPOINTMENT (OUTPATIENT)
Dept: ENDOCRINOLOGY | Facility: CLINIC | Age: 56
End: 2021-05-13
Payer: COMMERCIAL

## 2021-05-13 VITALS
OXYGEN SATURATION: 98 % | HEIGHT: 62 IN | SYSTOLIC BLOOD PRESSURE: 122 MMHG | BODY MASS INDEX: 32.2 KG/M2 | TEMPERATURE: 97.2 F | DIASTOLIC BLOOD PRESSURE: 70 MMHG | HEART RATE: 88 BPM | WEIGHT: 175 LBS

## 2021-05-13 DIAGNOSIS — N95.1 MENOPAUSAL AND FEMALE CLIMACTERIC STATES: ICD-10-CM

## 2021-05-13 DIAGNOSIS — R23.2 FLUSHING: ICD-10-CM

## 2021-05-13 DIAGNOSIS — Z72.89 OTHER PROBLEMS RELATED TO LIFESTYLE: ICD-10-CM

## 2021-05-13 DIAGNOSIS — R68.82 DECREASED LIBIDO: ICD-10-CM

## 2021-05-13 DIAGNOSIS — G47.00 INSOMNIA, UNSPECIFIED: ICD-10-CM

## 2021-05-13 PROCEDURE — 99203 OFFICE O/P NEW LOW 30 MIN: CPT

## 2021-05-13 PROCEDURE — 99072 ADDL SUPL MATRL&STAF TM PHE: CPT

## 2021-05-13 NOTE — REVIEW OF SYSTEMS
[Fatigue] : fatigue [Recent Weight Gain (___ Lbs)] : recent weight gain: [unfilled] lbs [Palpitations] : palpitations [Lower Ext Edema] : lower extremity edema [Dry Skin] : dry skin [Hair Loss] : hair loss [Tremors] : tremors [Depression] : depression [Anxiety] : anxiety [Stress] : stress [Hot Flashes] : hot flashes [Decreased Appetite] : appetite not decreased [Recent Weight Loss (___ Lbs)] : no recent weight loss [Fever] : no fever [Blurred Vision] : no blurred vision [Dysphagia] : no dysphagia [Neck Pain] : no neck pain [Dysphonia] : no dysphonia [Chest Pain] : no chest pain [Shortness Of Breath] : no shortness of breath [Cough] : no cough [Wheezing] : no wheezing [SOB on Exertion] : no shortness of breath on exertion [Nausea] : no nausea [Constipation] : no constipation [Vomiting] : no vomiting [Gas/Bloating] : no gas/bloating [Polyuria] : no polyuria [Dysuria] : no dysuria [Acanthosis] : no acanthosis  [Hirsutism] : no hirsutism [Headaches] : no headaches [Cold Intolerance] : no cold intolerance [Heat Intolerance] : no heat intolerance [Galactorrhea] : no galactorrhea

## 2021-05-13 NOTE — REASON FOR VISIT
[Initial Evaluation] : an initial evaluation [Menopause Symptoms] : menopause symptoms [FreeTextEntry2] : Dr Garcia

## 2021-05-13 NOTE — HISTORY OF PRESENT ILLNESS
[FreeTextEntry1] : 55 year old patient who is  status post laparoscopic supracervical hysterectomy bilateral salpingo-oophorectomy(10/2020) present for evaluation of menopause symptoms.\par Since surgery patient feels like she is not herself anymore , has hot flashes, insomnia, low libido, vaginal dryness. \yuliet is currently on Climata 0.0375 patch , dose was increased 3/2021 and she did not feel her symptoms improved much.\par to note her mother had breast cancer , she follows regularly with mammograms. \par \par

## 2021-05-13 NOTE — DATA REVIEWED
[FreeTextEntry1] : 4/7/21:TSH 1.18 Ft4 1.2 total test 28 hb 12.9  FSH 72.7 LH 53.1 prolactin 6.1 estradiol 71

## 2021-05-13 NOTE — PAST MEDICAL HISTORY
[FreeTextEntry5] : She is status post laparoscopic supracervical hysterectomy bilateral salpingo-oophorectomy. (10/2020)

## 2021-05-13 NOTE — ASSESSMENT
[FreeTextEntry1] : 55 year old patient who is  status post laparoscopic supracervical hysterectomy bilateral salpingo-oophorectomy(10/2020) present for evaluation of menopause symptoms.\par \par # hot flashes, low libido, vaginal dryness \par - post menopausal syndrome currently on  Climata 0.0375 patch , dose was increased 3/2021 and she did not feel her symptoms improved much.\par to note her mother had breast cancer , she follows regularly with mammograms. would like to limit estrogen use , no indications for progesterone as uterus is out\par can consider vaginal estrogen for dryness \par she will get psychiatry evaluation as she feels depressed and will consider SSRI/SNRI ( low dose paroxetine 7.5 mg /day or venlafaxine with adequate taper to prevent nausea  ) therapy if remain symptomatic \par recheck tft's and tpo

## 2021-05-13 NOTE — PHYSICAL EXAM
[Alert] : alert [Well Nourished] : well nourished [Healthy Appearance] : healthy appearance [No Acute Distress] : no acute distress [No Proptosis] : no proptosis [No Lid Lag] : no lid lag [Thyroid Not Enlarged] : the thyroid was not enlarged [No Thyroid Nodules] : no palpable thyroid nodules [No Respiratory Distress] : no respiratory distress [No Accessory Muscle Use] : no accessory muscle use [Clear to Auscultation] : lungs were clear to auscultation bilaterally [Normal S1, S2] : normal S1 and S2 [No Murmurs] : no murmurs [Regular Rhythm] : with a regular rhythm [No Edema] : no peripheral edema [Not Tender] : non-tender [Not Distended] : not distended [Soft] : abdomen soft [No Stigmata of Cushings Syndrome] : no stigmata of Cushings Syndrome [Normal Gait] : normal gait [Normal Reflexes] : deep tendon reflexes were 2+ and symmetric [Abdominal Striae] : no abdominal striae [de-identified] : laparoscopic surgical scars  [de-identified] : fine tremors

## 2021-08-16 ENCOUNTER — APPOINTMENT (OUTPATIENT)
Dept: ENDOCRINOLOGY | Facility: CLINIC | Age: 56
End: 2021-08-16

## 2021-09-21 RX ORDER — ESTRADIOL 0.03 MG/D
0.03 PATCH, EXTENDED RELEASE TRANSDERMAL
Qty: 13 | Refills: 1 | Status: COMPLETED | COMMUNITY
Start: 2021-09-20 | End: 2021-09-21

## 2021-10-10 LAB
BILIRUB UR QL STRIP: NORMAL
CLARITY UR: CLEAR
GLUCOSE UR-MCNC: NORMAL
HCG UR QL: NORMAL EU/DL
HGB UR QL STRIP.AUTO: NORMAL
KETONES UR-MCNC: NORMAL
LEUKOCYTE ESTERASE UR QL STRIP: NORMAL
PH UR STRIP: NORMAL
PROT UR STRIP-MCNC: NORMAL
SP GR UR STRIP: NORMAL

## 2022-07-21 NOTE — ASU PATIENT PROFILE, ADULT - FALL HARM RISK CONCLUSION
Universal Safety Interventions Rituxan Counseling:  I discussed with the patient the risks of Rituxan infusions. Side effects can include infusion reactions, severe drug rashes including mucocutaneous reactions, reactivation of latent hepatitis and other infections and rarely progressive multifocal leukoencephalopathy.  All of the patient's questions and concerns were addressed.

## 2022-10-23 NOTE — BRIEF OPERATIVE NOTE - NSICDXBRIEFPROCEDURE_GEN_ALL_CORE_FT
Her/She
PROCEDURES:  Repair, hernia, umbilical, adult 29-Oct-2020 10:42:13  Ashleigh Shultz  Laparoscopic supracervical hysterectomy with salpingectomy for uterus 250 grams or less 29-Oct-2020 10:41:48 and bilateral oophorectomy; lysis of adhesions Ashleigh Shultz

## 2023-12-11 ENCOUNTER — APPOINTMENT (OUTPATIENT)
Dept: CARDIOLOGY | Facility: CLINIC | Age: 58
End: 2023-12-11
Payer: COMMERCIAL

## 2023-12-11 VITALS
WEIGHT: 186 LBS | DIASTOLIC BLOOD PRESSURE: 96 MMHG | BODY MASS INDEX: 34.23 KG/M2 | HEIGHT: 62 IN | SYSTOLIC BLOOD PRESSURE: 142 MMHG | HEART RATE: 85 BPM

## 2023-12-11 DIAGNOSIS — E78.5 HYPERLIPIDEMIA, UNSPECIFIED: ICD-10-CM

## 2023-12-11 DIAGNOSIS — R94.31 ABNORMAL ELECTROCARDIOGRAM [ECG] [EKG]: ICD-10-CM

## 2023-12-11 DIAGNOSIS — R03.0 ELEVATED BLOOD-PRESSURE READING, W/OUT DIAGNOSIS OF HYPERTENSION: ICD-10-CM

## 2023-12-11 PROCEDURE — 99204 OFFICE O/P NEW MOD 45 MIN: CPT | Mod: 25

## 2023-12-11 PROCEDURE — 93000 ELECTROCARDIOGRAM COMPLETE: CPT

## 2023-12-11 RX ORDER — ESTRADIOL 0.03 MG/D
0.03 PATCH TRANSDERMAL
Qty: 4 | Refills: 1 | Status: DISCONTINUED | COMMUNITY
Start: 2021-01-28 | End: 2023-12-11

## 2023-12-11 RX ORDER — ESTRADIOL 0.03 MG/D
0.03 PATCH TRANSDERMAL
Qty: 13 | Refills: 0 | Status: DISCONTINUED | COMMUNITY
Start: 2020-11-13 | End: 2023-12-11

## 2023-12-11 RX ORDER — ESTRADIOL 0.04 MG/D
0.04 PATCH TRANSDERMAL
Qty: 13 | Refills: 1 | Status: DISCONTINUED | COMMUNITY
Start: 2021-09-21 | End: 2023-12-11

## 2023-12-11 RX ORDER — TERCONAZOLE 8 MG/G
0.8 CREAM VAGINAL
Qty: 1 | Refills: 1 | Status: DISCONTINUED | COMMUNITY
Start: 2021-02-12 | End: 2023-12-11

## 2023-12-11 RX ORDER — ESTRADIOL 0.04 MG/D
0.04 PATCH TRANSDERMAL
Qty: 13 | Refills: 1 | Status: DISCONTINUED | COMMUNITY
Start: 2021-03-23 | End: 2023-12-11

## 2023-12-26 ENCOUNTER — EMERGENCY (EMERGENCY)
Facility: HOSPITAL | Age: 58
LOS: 0 days | Discharge: HOME | End: 2023-12-26
Attending: EMERGENCY MEDICINE
Payer: COMMERCIAL

## 2023-12-26 VITALS
OXYGEN SATURATION: 99 % | RESPIRATION RATE: 16 BRPM | DIASTOLIC BLOOD PRESSURE: 90 MMHG | SYSTOLIC BLOOD PRESSURE: 150 MMHG | HEART RATE: 78 BPM

## 2023-12-26 VITALS
DIASTOLIC BLOOD PRESSURE: 91 MMHG | RESPIRATION RATE: 16 BRPM | OXYGEN SATURATION: 98 % | TEMPERATURE: 98 F | SYSTOLIC BLOOD PRESSURE: 146 MMHG | HEIGHT: 63 IN | WEIGHT: 184.97 LBS | HEART RATE: 71 BPM
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
## 2023-12-26 DIAGNOSIS — Z98.890 OTHER SPECIFIED POSTPROCEDURAL STATES: Chronic | ICD-10-CM

## 2023-12-26 DIAGNOSIS — M25.462 EFFUSION, LEFT KNEE: ICD-10-CM

## 2023-12-26 DIAGNOSIS — S86.912A STRAIN OF UNSPECIFIED MUSCLE(S) AND TENDON(S) AT LOWER LEG LEVEL, LEFT LEG, INITIAL ENCOUNTER: ICD-10-CM

## 2023-12-26 DIAGNOSIS — M25.562 PAIN IN LEFT KNEE: ICD-10-CM

## 2023-12-26 DIAGNOSIS — M79.642 PAIN IN LEFT HAND: ICD-10-CM

## 2023-12-26 DIAGNOSIS — Z88.1 ALLERGY STATUS TO OTHER ANTIBIOTIC AGENTS STATUS: ICD-10-CM
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
## 2023-12-26 DIAGNOSIS — Z98.891 HISTORY OF UTERINE SCAR FROM PREVIOUS SURGERY: Chronic | ICD-10-CM

## 2023-12-26 PROCEDURE — 73130 X-RAY EXAM OF HAND: CPT | Mod: LT

## 2023-12-26 PROCEDURE — 99284 EMERGENCY DEPT VISIT MOD MDM: CPT | Mod: 25

## 2023-12-26 PROCEDURE — 99284 EMERGENCY DEPT VISIT MOD MDM: CPT

## 2023-12-26 PROCEDURE — 93970 EXTREMITY STUDY: CPT

## 2023-12-26 PROCEDURE — 73564 X-RAY EXAM KNEE 4 OR MORE: CPT | Mod: LT

## 2023-12-26 PROCEDURE — 73564 X-RAY EXAM KNEE 4 OR MORE: CPT | Mod: 26,LT

## 2023-12-26 PROCEDURE — 93970 EXTREMITY STUDY: CPT | Mod: 26

## 2023-12-26 PROCEDURE — 73130 X-RAY EXAM OF HAND: CPT | Mod: 26,LT

## 2023-12-26 NOTE — ED PROVIDER NOTE - PHYSICAL EXAMINATION
Physical Exam    Vital Signs: I have reviewed the initial vital signs.  Constitutional: appears stated age, no acute distress  Eyes: Conjunctiva pink, Sclera clear,  Musculoskeletal: from of left knee, mild ttp to left knee, no snuff box ttp to left wrist, from of left wrist.   Integumentary: warm, dry, no rash  Neurologic: awake, alert, nvi

## 2023-12-26 NOTE — ED ADULT NURSE NOTE - IN THE PAST 12 MONTHS HAVE YOU USED DRUGS OTHER THAN THOSE REQUIRED FOR MEDICAL REASON?
PATIENT: Irving Linn  MRN: [id number]  DATE: 7/29/2022      Diagnosis:   1. HCC (hepatocellular carcinoma)    2. Hepatitis C virus infection without hepatic coma, unspecified chronicity    3. Normocytic anemia    4. Thrombocytopenia    5. Hypertension, unspecified type    6. Cancer related pain    7. Chest pain, unspecified type        Chief Complaint: Hepatic Cancer (2 week follow up )      Subjective:     Interval History: Mr. Linn is a 62 y.o. male with Cirrhosis, GERD, HTN, Hepatitis C who presents for follow up for HCC.  Since the last clinic visit the patient saw his cardiologist Dr Gomez for the CP and SOB and plans on performing a stress test.  The patient continues to have chest pain, SOB, and pain in the left hip.  The patient denies cough, abdominal pain, N/V, constipation, diarrhea.  The patient denies fever, chills, night sweats, weight loss, new lumps or bumps, easy bruising or bleeding.    Prior History: The patient initially presented to the ED on 6/16/22 with complaint of SOB.  US of the LE 6/16/22 showed a nonocclusive thrombus in the distal superficial femoral vein on the left.  CTA C/A/P on 6/16/22 showed a mass in the proximal IVC extending into the right atrium highly suspicious for malignancy and a rounded lesion in the inferior right lobe of the liver.  US of the abdomen on 6/17/22 showed a 4.8 x 4.6 for 5.2 cm heterogeneous lesion in the right hepatic lobe; echogenic lesion within the IVC measuring 4.9 x 3.3 x 4.2 cm.  MRI of the abdomen and pelvis 6/17/22 showed large heterogeneously enhancing mass within the IVC in standing and the right atrium measuring 7.6 x 5.9 x 4 cm with occlusion of the right hepatic vein; hypoenhancing mass within the inferior right hepatic lobe measuring 5.6 x 5.1 cm; several mildly enlarged re hepatic lymph nodes measuring 1.4 cm; and diffusely lobe marrow signal concerning for marrow replacement process in the bones.   The patient underwent liver biopsy  7/08/22 with path showing HCC    Past Medical History:   Past Medical History:   Diagnosis Date    Cirrhosis     GERD (gastroesophageal reflux disease)     HTN (hypertension)        Past Surgical HIstory:   Past Surgical History:   Procedure Laterality Date    MANDIBLE FRACTURE SURGERY         Family History:   Family History   Problem Relation Age of Onset    Lung cancer Mother     Cervical cancer Mother     Bladder Cancer Sister     Breast cancer Sister        Social History:  reports that he quit smoking about 12 months ago. His smoking use included cigarettes. He has a 20.00 pack-year smoking history. He has quit using smokeless tobacco.  His smokeless tobacco use included chew. He reports current drug use. He reports that he does not drink alcohol.    Allergies:  Review of patient's allergies indicates:  No Known Allergies    Medications:  Current Outpatient Medications   Medication Sig Dispense Refill    albuterol (PROVENTIL/VENTOLIN HFA) 90 mcg/actuation inhaler Inhale 2 puffs into the lungs every 4 (four) hours as needed for Shortness of Breath.      amLODIPine (NORVASC) 5 MG tablet Take 5 mg by mouth every morning.      loperamide (IMODIUM) 2 mg capsule Take 1 capsule (2 mg total) by mouth 4 (four) times daily as needed for Diarrhea. 30 capsule 2    ondansetron (ZOFRAN-ODT) 8 MG TbDL Take 1 tablet (8 mg total) by mouth every 8 (eight) hours as needed (nausea). 40 tablet 3    pantoprazole (PROTONIX) 40 MG tablet Take 40 mg by mouth every morning.      traZODone (DESYREL) 50 MG tablet Take 50 mg by mouth nightly as needed for Insomnia.      oxyCODONE (ROXICODONE) 5 MG immediate release tablet Take 1 tablet (5 mg total) by mouth every 4 (four) hours as needed (Pain). 60 tablet 0     No current facility-administered medications for this visit.     Facility-Administered Medications Ordered in Other Visits   Medication Dose Route Frequency Provider Last Rate Last Admin    diphenhydrAMINE injection  "50 mg  50 mg Intravenous Once PRN Srinivas Coronel MD        EPINEPHrine (EPIPEN) 0.3 mg/0.3 mL pen injection 0.3 mg  0.3 mg Intramuscular Once PRN Srinivas Coronel MD        hydrocortisone sodium succinate injection 100 mg  100 mg Intravenous Once PRN Srinivas Coronel MD        sodium chloride 0.9% flush 10 mL  10 mL Intravenous PRN Srinivas Coronel MD           Review of Systems   Constitutional: Negative for chills, diaphoresis, fatigue, fever and unexpected weight change.   Respiratory: Positive for shortness of breath. Negative for cough.    Cardiovascular: Negative for chest pain and palpitations.   Gastrointestinal: Negative for abdominal pain, constipation, diarrhea, nausea and vomiting.   Musculoskeletal:        Chest pain  Left hip pain   Skin: Negative for color change and rash.   Neurological: Negative for headaches.   Hematological: Negative for adenopathy. Does not bruise/bleed easily.       ECOG Performance Status: 1   Objective:      Vitals:   Vitals:    07/29/22 0932   BP: 106/70   BP Location: Right arm   Patient Position: Sitting   BP Method: Medium (Manual)   Pulse: 76   Temp: 97.9 °F (36.6 °C)   TempSrc: Temporal   SpO2: 98%   Weight: 75.6 kg (166 lb 10.7 oz)   Height: 5' 8" (1.727 m)       Physical Exam  Constitutional:       General: He is not in acute distress.     Appearance: He is well-developed. He is not diaphoretic.   HENT:      Head: Normocephalic and atraumatic.   Cardiovascular:      Rate and Rhythm: Normal rate and regular rhythm.      Heart sounds: Normal heart sounds. No murmur heard.    No friction rub. No gallop.   Pulmonary:      Effort: Pulmonary effort is normal. No respiratory distress.      Breath sounds: Rhonchi (at bases) present. No wheezing or rales.   Chest:      Chest wall: No tenderness.   Breasts:      Right: No axillary adenopathy or supraclavicular adenopathy.      Left: No axillary adenopathy or supraclavicular adenopathy.       Abdominal:      General: Bowel " sounds are normal. There is no distension.      Palpations: Abdomen is soft. There is no mass.      Tenderness: There is no abdominal tenderness. There is no rebound.   Musculoskeletal:      Cervical back: Normal range of motion.   Lymphadenopathy:      Cervical: No cervical adenopathy.      Upper Body:      Right upper body: No supraclavicular or axillary adenopathy.      Left upper body: No supraclavicular or axillary adenopathy.   Skin:     General: Skin is warm and dry.      Findings: No erythema or rash.   Neurological:      Mental Status: He is alert and oriented to person, place, and time.   Psychiatric:         Behavior: Behavior normal.         Laboratory Data:  Lab Visit on 07/28/2022   Component Date Value Ref Range Status    WBC 07/28/2022 4.02  3.90 - 12.70 K/uL Final    RBC 07/28/2022 3.14 (A) 4.60 - 6.20 M/uL Final    Hemoglobin 07/28/2022 9.2 (A) 14.0 - 18.0 g/dL Final    Hematocrit 07/28/2022 28.9 (A) 40.0 - 54.0 % Final    MCV 07/28/2022 92  82 - 98 fL Final    MCH 07/28/2022 29.3  27.0 - 31.0 pg Final    MCHC 07/28/2022 31.8 (A) 32.0 - 36.0 g/dL Final    RDW 07/28/2022 15.0 (A) 11.5 - 14.5 % Final    Platelets 07/28/2022 92 (A) 150 - 450 K/uL Final    MPV 07/28/2022 10.8  9.2 - 12.9 fL Final    Immature Granulocytes 07/28/2022 0.2  0.0 - 0.5 % Final    Gran # (ANC) 07/28/2022 1.6 (A) 1.8 - 7.7 K/uL Final    Immature Grans (Abs) 07/28/2022 0.01  0.00 - 0.04 K/uL Final    Comment: Mild elevation in immature granulocytes is non specific and   can be seen in a variety of conditions including stress response,   acute inflammation, trauma and pregnancy. Correlation with other   laboratory and clinical findings is essential.      Lymph # 07/28/2022 1.7  1.0 - 4.8 K/uL Final    Mono # 07/28/2022 0.5  0.3 - 1.0 K/uL Final    Eos # 07/28/2022 0.1  0.0 - 0.5 K/uL Final    Baso # 07/28/2022 0.06  0.00 - 0.20 K/uL Final    nRBC 07/28/2022 0  0 /100 WBC Final    Gran % 07/28/2022 40.4  38.0 -  73.0 % Final    Lymph % 07/28/2022 41.5  18.0 - 48.0 % Final    Mono % 07/28/2022 13.4  4.0 - 15.0 % Final    Eosinophil % 07/28/2022 3.0  0.0 - 8.0 % Final    Basophil % 07/28/2022 1.5  0.0 - 1.9 % Final    Differential Method 07/28/2022 Automated   Final    Sodium 07/28/2022 136  136 - 145 mmol/L Final    Potassium 07/28/2022 4.6  3.5 - 5.1 mmol/L Final    Chloride 07/28/2022 107  95 - 110 mmol/L Final    CO2 07/28/2022 23  23 - 29 mmol/L Final    Glucose 07/28/2022 89  70 - 110 mg/dL Final    BUN 07/28/2022 13  8 - 23 mg/dL Final    Creatinine 07/28/2022 0.9  0.5 - 1.4 mg/dL Final    Calcium 07/28/2022 9.2  8.7 - 10.5 mg/dL Final    Total Protein 07/28/2022 9.0 (A) 6.0 - 8.4 g/dL Final    Albumin 07/28/2022 3.0 (A) 3.5 - 5.2 g/dL Final    Total Bilirubin 07/28/2022 1.8 (A) 0.1 - 1.0 mg/dL Final    Comment: For infants and newborns, interpretation of results should be based  on gestational age, weight and in agreement with clinical  observations.    Premature Infant recommended reference ranges:  Up to 24 hours.............<8.0 mg/dL  Up to 48 hours............<12.0 mg/dL  3-5 days..................<15.0 mg/dL  6-29 days.................<15.0 mg/dL      Alkaline Phosphatase 07/28/2022 131  55 - 135 U/L Final    AST 07/28/2022 43 (A) 10 - 40 U/L Final    ALT 07/28/2022 19  10 - 44 U/L Final    Anion Gap 07/28/2022 6 (A) 8 - 16 mmol/L Final    eGFR if African American 07/28/2022 >60  >60 mL/min/1.73 m^2 Final    eGFR if non African American 07/28/2022 >60  >60 mL/min/1.73 m^2 Final    Comment: Calculation used to obtain the estimated glomerular filtration  rate (eGFR) is the CKD-EPI equation.       Prothrombin Time 07/28/2022 12.4  9.0 - 12.5 sec Final    INR 07/28/2022 1.2  0.8 - 1.2 Final    Comment: Coumadin Therapy:  2.0 - 3.0 for INR for all indicators except mechanical heart valves  and antiphospholipid syndromes which should use 2.5 - 3.5.      TSH 07/28/2022 0.919  0.400 - 4.000  uIU/mL Final         Imaging: Reviewed    MRI of the abdomen and pelvis 6/17/22 showed  Image quality is degraded by motion artifact.     Inferior Thorax: Small bilateral pleural effusions.  Few bibasilar patchy opacities, better appreciated on prior CT.     Liver: Upper limit of normal size.  Nodular contour suggestive for cirrhosis.  Large heterogeneously enhancing diffusion restricting soft tissue mass centered within the IVC extending to the right atrium.  Lesion measures approximately 7.6 x 5.9 x 4.0 cm (series 24, image 32; series 22, image 22).  Mass extends into and occludes the right hepatic vein.  Hypoenhancing diffusion restricting mass within the inferior right hepatic lobe segment 5 measuring 5.6 x 5.1 cm (series 22, image 53).  Portal veins appear patent.     Gallbladder: Gallbladder is contracted and contains multiple stones.  Hepatic segment 5 mass closely abuts and may involve the adjacent gallbladder.     Bile Ducts: No significant ductal dilatation.     Pancreas: No focal mass or ductal dilatation.     Spleen: Mildly enlarged measuring 13 cm.  No focal lesion.     Adrenals: Unremarkable.     Kidneys/Ureters: Few small bilateral cysts.  No solid enhancing renal mass.  No hydronephrosis.     Bladder: Circumferential wall thickening and surrounding fat stranding.     Prostate: Unremarkable.     GI Tract/Mesentery: No evidence of bowel obstruction or inflammation.  Colonic diverticulosis.     Peritoneal Space: Small volume abdominopelvic ascites.     Retroperitoneum: Several mildly enlarged re hepatic lymph nodes measuring up to 1.4 cm.     Abdominal wall: Mild body wall edema.     Vasculature: Abdominal aorta is normal in caliber and contains atherosclerosis. Splenic vein and SMV are patent.  Upper abdominal collateral vessels.  Infrarenal IVC filter noted. Thin filling defect within the left superficial femoral and common femoral veins consistent with known partially occlusive DVT.     Bones:  Diffusely low marrow signal concerning for a marrow replacement process.        Assessment:       1. HCC (hepatocellular carcinoma)    2. Hepatitis C virus infection without hepatic coma, unspecified chronicity    3. Normocytic anemia    4. Thrombocytopenia    5. Hypertension, unspecified type    6. Cancer related pain    7. Chest pain, unspecified type           Plan:     HCC - The patient has HCC with a large liver mass as well as an IVC mass extending into the right atrium  -Pt is Child Suresh B9 excluding the patient from treatment with Atezolizumab and Sue, Sorafenib, Lenvatinib  -Pt set up for Chemo Care Companion  -Pt following with palliative care  -Will proceed with C1D1 of pembrolizumab    Hepatitis C - Pt currently being managed by Dr David Bains in [facility] ([phone])  -PT not currently on treatment  -Will monitor    Anemia of Chronic Disease - Iron studies from 6/28/22 concerning for anemia of chrocic disease  -Hemoglobin is 9.2g/dL on 7/28/22  -Will montor    Thrombocytopenia - likely due to liver disease  -platelets are 92k on 7/28/22  -Peripheral smear showed no significant findings    HTN - pt on amlodipine  -Controlled   -Will monitor    Cancer Related Pain - will prescribe oxycodone 5mg every 4 hours  -Will monitor    Chest Pain - pt is to have stress test next week under the care of his cardiologist Dr. Gomez at [facility]    Advance Care Planning     Date: 07/14/2022    Power of   I initiated the process of advance care planning today and explained the importance of this process to the patient.  I introduced the concept of advance directives to the patient, as well. Then the patient received detailed information about the importance of designating a Health Care Power of  (HCPOA). He was also instructed to communicate with this person about their wishes for future healthcare, should he become sick and lose decision-making capacity. The patient has  previously appointed a HCPOA. After our discussion, the patient has decided to complete a HCPOA and has appointed health care agent: Hattierani Dias & health care agent number: [phone] .              Route Chart for Scheduling    Med Onc Chart Routing      Follow up with physician 3 weeks. The patient can proceed with treatment.  He needs a CBC, CMP, TSH and INR on 8/18/22 with an appt with em and treatment on 8/19/22.   Follow up with ARLYN    Infusion scheduling note    Injection scheduling note    Labs    Imaging    Pharmacy appointment    Other referrals          Treatment Plan Information   OP PEMBROLIZUMAB 200MG Q3W   Srinivas Coronel MD   Upcoming Treatment Dates - OP PEMBROLIZUMAB 200MG Q3W    8/19/2022       Chemotherapy       pembrolizumab (KEYTRUDA) 200 mg in sodium chloride 0.9% 108 mL infusion  9/9/2022       Chemotherapy       pembrolizumab (KEYTRUDA) 200 mg in sodium chloride 0.9% 108 mL infusion  9/30/2022       Chemotherapy       pembrolizumab (KEYTRUDA) 200 mg in sodium chloride 0.9% 108 mL infusion  10/21/2022       Chemotherapy       pembrolizumab (KEYTRUDA) 200 mg in sodium chloride 0.9% 108 mL infusion      Srinivas Coronel MD  [facility]  Hematology and Oncology  [facility]   [address] [place] [address]   O: [phone]  F: [phone]             No

## 2023-12-26 NOTE — ED PROVIDER NOTE - NSFOLLOWUPINSTRUCTIONS_ED_ALL_ED_FT
Knee Pain    WHAT YOU NEED TO KNOW:    Knee pain may start suddenly, or it may be a long-term problem. You may have pain on the side, front, or back of your knee. You may have knee stiffness and swelling. You may hear popping sounds or feel like your knee is giving way or locking up as you walk. You may feel pain when you sit, stand, walk, or climb up and down stairs. Knee pain can be caused by conditions such as obesity, inflammation, or strains or tears in ligaments or tendons.     DISCHARGE INSTRUCTIONS:    Return to the emergency department if:     Your pain is worse, even after treatment.       You cannot bend or straighten your leg completely.       The swelling around your knee does not go down even with treatment.      Your knee is painful and hot to the touch.     Contact your healthcare provider if:     You have questions or concerns about your condition or care.         Medicines: You may need any of the following:     NSAIDs help decrease swelling and pain or fever. This medicine is available with or without a doctor's order. NSAIDs can cause stomach bleeding or kidney problems in certain people. If you take blood thinner medicine, always ask your healthcare provider if NSAIDs are safe for you. Always read the medicine label and follow directions.      Acetaminophen decreases pain and fever. It is available without a doctor's order. Ask how much to take and how often to take it. Follow directions. Read the labels of all other medicines you are using to see if they also contain acetaminophen, or ask your doctor or pharmacist. Acetaminophen can cause liver damage if not taken correctly. Do not use more than 4 grams (4,000 milligrams) total of acetaminophen in one day.       Prescription pain medicine may be given. Ask your healthcare provider how to take this medicine safely. Some prescription pain medicines contain acetaminophen. Do not take other medicines that contain acetaminophen without talking to your healthcare provider. Too much acetaminophen may cause liver damage. Prescription pain medicine may cause constipation. Ask your healthcare provider how to prevent or treat constipation.       Take your medicine as directed. Contact your healthcare provider if you think your medicine is not helping or if you have side effects. Tell him or her if you are allergic to any medicine. Keep a list of the medicines, vitamins, and herbs you take. Include the amounts, and when and why you take them. Bring the list or the pill bottles to follow-up visits. Carry your medicine list with you in case of an emergency.    What you can do to manage your symptoms:     Rest your knee so it can heal. Limit activities that increase your pain. Do low-impact exercises, such as walking or swimming.       Apply ice to help reduce swelling and pain. Use an ice pack, or put crushed ice in a plastic bag. Cover it with a towel before you apply it to your knee. Apply ice for 15 to 20 minutes every hour, or as directed.      Apply compression to help reduce swelling. Use a brace or bandage only as directed.      Elevate your knee to help decrease pain and swelling. Elevate your knee while you are sitting or lying down. Prop your leg on pillows to keep your knee above the level of your heart.      Prevent your knee from moving as directed. Your healthcare provider may put on a cast or splint. You may need to wear a leg brace to stabilize your knee. A leg brace can be adjusted to increase your range of motion as your knee heals.Hinged Knee Braces          What you can do to prevent knee pain:     Maintain a healthy weight. Extra weight increases your risk for knee pain. Ask your healthcare provider how much you should weigh. He or she can help you create a safe weight loss plan if you need to lose weight.      Exercise or train properly. Use the correct equipment for sports. Wear shoes that provide good support. Check your posture often as you exercise, play sports, or train for an event. This can help prevent stress and strain on your knees. Rest between sessions so you do not overwork your knees.    Follow up with your healthcare provider within 24 hours or as directed: You may need follow-up treatments, such as steroid injections to decrease pain. Write down your questions so you remember to ask them during your visits.        © Copyright [employer] 2019 All illustrations and images included in CareNotes are the copyrighted property of [employer] or [employer]. Knee Pain    WHAT YOU NEED TO KNOW:    Knee pain may start suddenly, or it may be a long-term problem. You may have pain on the side, front, or back of your knee. You may have knee stiffness and swelling. You may hear popping sounds or feel like your knee is giving way or locking up as you walk. You may feel pain when you sit, stand, walk, or climb up and down stairs. Knee pain can be caused by conditions such as obesity, inflammation, or strains or tears in ligaments or tendons.     DISCHARGE INSTRUCTIONS:    Return to the emergency department if:     Your pain is worse, even after treatment.       You cannot bend or straighten your leg completely.       The swelling around your knee does not go down even with treatment.      Your knee is painful and hot to the touch.     Contact your healthcare provider if:     You have questions or concerns about your condition or care.         Medicines: You may need any of the following:     NSAIDs help decrease swelling and pain or fever. This medicine is available with or without a doctor's order. NSAIDs can cause stomach bleeding or kidney problems in certain people. If you take blood thinner medicine, always ask your healthcare provider if NSAIDs are safe for you. Always read the medicine label and follow directions.      Acetaminophen decreases pain and fever. It is available without a doctor's order. Ask how much to take and how often to take it. Follow directions. Read the labels of all other medicines you are using to see if they also contain acetaminophen, or ask your doctor or pharmacist. Acetaminophen can cause liver damage if not taken correctly. Do not use more than 4 grams (4,000 milligrams) total of acetaminophen in one day.       Prescription pain medicine may be given. Ask your healthcare provider how to take this medicine safely. Some prescription pain medicines contain acetaminophen. Do not take other medicines that contain acetaminophen without talking to your healthcare provider. Too much acetaminophen may cause liver damage. Prescription pain medicine may cause constipation. Ask your healthcare provider how to prevent or treat constipation.       Take your medicine as directed. Contact your healthcare provider if you think your medicine is not helping or if you have side effects. Tell him or her if you are allergic to any medicine. Keep a list of the medicines, vitamins, and herbs you take. Include the amounts, and when and why you take them. Bring the list or the pill bottles to follow-up visits. Carry your medicine list with you in case of an emergency.    What you can do to manage your symptoms:     Rest your knee so it can heal. Limit activities that increase your pain. Do low-impact exercises, such as walking or swimming.       Apply ice to help reduce swelling and pain. Use an ice pack, or put crushed ice in a plastic bag. Cover it with a towel before you apply it to your knee. Apply ice for 15 to 20 minutes every hour, or as directed.      Apply compression to help reduce swelling. Use a brace or bandage only as directed.      Elevate your knee to help decrease pain and swelling. Elevate your knee while you are sitting or lying down. Prop your leg on pillows to keep your knee above the level of your heart.      Prevent your knee from moving as directed. Your healthcare provider may put on a cast or splint. You may need to wear a leg brace to stabilize your knee. A leg brace can be adjusted to increase your range of motion as your knee heals.Hinged Knee Braces          What you can do to prevent knee pain:     Maintain a healthy weight. Extra weight increases your risk for knee pain. Ask your healthcare provider how much you should weigh. He or she can help you create a safe weight loss plan if you need to lose weight.      Exercise or train properly. Use the correct equipment for sports. Wear shoes that provide good support. Check your posture often as you exercise, play sports, or train for an event. This can help prevent stress and strain on your knees. Rest between sessions so you do not overwork your knees.    Follow up with your healthcare provider within 24 hours or as directed: You may need follow-up treatments, such as steroid injections to decrease pain. Write down your questions so you remember to ask them during your visits.        © Copyright [employer] 2019 All illustrations and images included in CareNotes are the copyrighted property of [employer] or [employer].

## 2023-12-26 NOTE — ED PROVIDER NOTE - PATIENT PORTAL LINK FT
You can access the FollowMyHealth Patient Portal offered by [facility] by registering at the following website: [email or web address]. By joining [employer]’s FollowMyHealth portal, you will also be able to view your health information using other applications (apps) compatible with our system. You can access the FollowMyHealth Patient Portal offered by [facility] by registering at the following website: [email or web address]. By joining [employer]’s FollowMyHealth portal, you will also be able to view your health information using other applications (apps) compatible with our system.

## 2023-12-26 NOTE — ED PROVIDER NOTE - CLINICAL SUMMARY MEDICAL DECISION MAKING FREE TEXT BOX
knee strain, joint stable extensor mechanism intact NVI distally, +small prepatellar effusion, imaging appreciated, will d/c BRAVO, ortho f/u

## 2023-12-26 NOTE — ED ADULT TRIAGE NOTE - CHIEF COMPLAINT QUOTE
Left leg swelling and knee pain for a week.  Fell down attic stairs from leg locking up.  Also has left hand swelling and pain, but not from fall

## 2023-12-26 NOTE — ED ADULT NURSE NOTE - NSFALLRISKINTERV_ED_ALL_ED
Communicate fall risk and risk factors to all staff, patient, and family/Provide visual cue: yellow wristband, yellow gown, etc/Reinforce activity limits and safety measures with patient and family/Call bell, personal items and telephone in reach/Instruct patient to call for assistance before getting out of bed/chair/stretcher/Non-slip footwear applied when patient is off stretcher/[place] to call system/Physically safe environment - no spills, clutter or unnecessary equipment/Purposeful Proactive Rounding/Room/bathroom lighting operational, light cord in reach Communicate fall risk and risk factors to all staff, patient, and family/Provide visual cue: yellow wristband, yellow gown, etc/Reinforce activity limits and safety measures with patient and family/Call bell, personal items and telephone in reach/Instruct patient to call for assistance before getting out of bed/chair/stretcher/Non-slip footwear applied when patient is off stretcher/[place] to call system/Physically safe environment - no spills, clutter or unnecessary equipment/Purposeful Proactive Rounding/Room/bathroom lighting operational, light cord in reach

## 2023-12-26 NOTE — ED PROCEDURE NOTE - CPROC ED PHYSICIAN PRESENCE1
alert,  in no acute distress,  well developed,obese
I was present during the key portion of the procedure.

## 2023-12-26 NOTE — ED PROVIDER NOTE - NS ED ATTENDING STATEMENT MOD
This was a shared visit with the RIM[facility]. I reviewed and verified the documentation and independently performed the documented:

## 2023-12-26 NOTE — ED PROVIDER NOTE - OBJECTIVE STATEMENT
57 yo female, presents to er for fall, c/o left knee and hand pain, mild, aching, no radiation. Denies numbness, tingling, limited rom. 59 yo female, presents to er for fall, c/o left knee and hand pain, mild, aching, no radiation. Denies numbness, tingling, limited rom.

## 2023-12-28 ENCOUNTER — APPOINTMENT (OUTPATIENT)
Dept: CARDIOLOGY | Facility: CLINIC | Age: 58
End: 2023-12-28

## 2024-01-24 ENCOUNTER — APPOINTMENT (OUTPATIENT)
Dept: CARDIOLOGY | Facility: CLINIC | Age: 59
End: 2024-01-24

## 2024-01-29 ENCOUNTER — APPOINTMENT (OUTPATIENT)
Dept: CARDIOLOGY | Facility: CLINIC | Age: 59
End: 2024-01-29

## 2024-03-04 NOTE — ED ADULT TRIAGE NOTE - INTERNATIONAL TRAVEL
Calculate Months Of Therapy Based On Documented Dosages (Will Hide Months Of Therapy Question)?: Yes Myalgia Monitoring: I explained this is common when taking isotretinoin. If this worsens they will contact us. Pounds Preamble Statement (Weight Entered In Details Tab): Reported Weight in pounds: Is Cheilitis Present?: Yes - Normal Treatment Any Headache: No Xerosis Aggressive Treatment: I recommended application of Cetaphil or CeraVe numerous times a day and before going to bed to all dry areas. I also prescribed a topical steroid for twice daily use. Lower Range (In Mg/Kg): 120 Headache Monitoring: I recommended monitoring the headaches for now. There is no evidence of increased intracranial pressure. They were instructed to call if the headaches are worsening. Next Month's Dosage: Continue Current Dosage Dosing Month 3 (Required For Cumulative Dosing): 40mg BID Dosing Month 1 (Required For Cumulative Dosing): 40mg Daily Xerosis Normal Treatment: I recommended application of Cetaphil or CeraVe numerous times a day going to bed to all dry areas. Cheilitis Normal Treatment: I recommended application of Vaseline or Aquaphor numerous times a day (as often as every hour) and before going to bed. No Cheilitis Aggressive Treatment: I recommended application of Vaseline or Aquaphor numerous times a day (as often as every hour) and before going to bed. I also prescribed a topical steroid for twice daily use. Nosebleeds Normal Treatment: I explained this is common when taking isotretinoin. I recommended saline mist in each nostril multiple times a day. If this worsens they will contact us. What Is The Patient's Gender: Female Myalgia Treatment: I explained this is common when taking isotretinoin. If this worsens they will contact us. They may try OTC ibuprofen. Weight Units: pounds Counseling Text: I reviewed the side effect in detail. Patient should get monthly blood tests, not donate blood, not drive at night if vision affected, and not share medication. Use Therapeutic Ranged Or Therapeutic Target: Target Retinoid Dermatitis Aggressive Treatment: I recommended more frequent application of Cetaphil or CeraVe to the areas of dermatitis. I also prescribed a topical steroid for twice daily use until the dermatitis resolves. Hypercholesterolemia Monitoring: I explained this is common when taking isotretinoin. We will monitor closely. Male Completion Statement: After discussing his treatment course we decided to discontinue isotretinoin therapy at this time. He shouldn't donate blood for one month after the last dose. He should call with any new symptoms of depression. Xerosis Normal Treatment: I recommended application of Cetaphil or CeraVe numerous times a day and before going to bed to all dry areas. Target Cumulative Dosage (In Mg/Kg): 135 Xerosis Aggressive Treatment: I recommended application of Cetaphil or CeraVe numerous times a day going to bed to all dry areas. I also prescribed a topical steroid for twice daily use. Retinoid Dermatitis Normal Treatment: I recommended more frequent application of Cetaphil or CeraVe to the areas of dermatitis. Kilograms Preamble Statement (Weight Entered In Details Tab): Reported Weight in kilograms: Upper Range (In Mg/Kg): 150 Female Pregnancy Counseling Text: Female patients should also be on two forms of birth control while taking this medication and for one month after their last dose. Detail Level: Zone Female Completion Statement: After discussing her treatment course we decided to discontinue isotretinoin therapy at this time. I explained that she would need to continue her birth control methods for at least one month after the last dosage. She should also get a pregnancy test one month after the last dose. She shouldn't donate blood for one month after the last dose. She should call with any new symptoms of depression.

## 2024-05-07 ENCOUNTER — EMERGENCY (EMERGENCY)
Facility: HOSPITAL | Age: 59
LOS: 0 days | Discharge: HOME | End: 2024-05-07
Attending: EMERGENCY MEDICINE
Payer: COMMERCIAL

## 2024-05-07 VITALS
DIASTOLIC BLOOD PRESSURE: 90 MMHG | HEIGHT: 63 IN | TEMPERATURE: 98 F | HEART RATE: 82 BPM | OXYGEN SATURATION: 99 % | WEIGHT: 182.98 LBS | SYSTOLIC BLOOD PRESSURE: 146 MMHG | RESPIRATION RATE: 18 BRPM

## 2024-05-07 DIAGNOSIS — Z98.891 HISTORY OF UTERINE SCAR FROM PREVIOUS SURGERY: Chronic | ICD-10-CM

## 2024-05-07 DIAGNOSIS — M79.89 OTHER SPECIFIED SOFT TISSUE DISORDERS: ICD-10-CM

## 2024-05-07 DIAGNOSIS — R07.2 PRECORDIAL PAIN: ICD-10-CM

## 2024-05-07 DIAGNOSIS — E78.5 HYPERLIPIDEMIA, UNSPECIFIED: ICD-10-CM

## 2024-05-07 DIAGNOSIS — Z98.890 OTHER SPECIFIED POSTPROCEDURAL STATES: Chronic | ICD-10-CM

## 2024-05-07 DIAGNOSIS — Z88.1 ALLERGY STATUS TO OTHER ANTIBIOTIC AGENTS STATUS: ICD-10-CM

## 2024-05-07 DIAGNOSIS — I10 ESSENTIAL (PRIMARY) HYPERTENSION: ICD-10-CM

## 2024-05-07 DIAGNOSIS — Z90.710 ACQUIRED ABSENCE OF BOTH CERVIX AND UTERUS: ICD-10-CM

## 2024-05-07 LAB
ALBUMIN SERPL ELPH-MCNC: 4.5 G/DL — SIGNIFICANT CHANGE UP (ref 3.5–5.2)
ALP SERPL-CCNC: 50 U/L — SIGNIFICANT CHANGE UP (ref 30–115)
ALT FLD-CCNC: 21 U/L — SIGNIFICANT CHANGE UP (ref 0–41)
ANION GAP SERPL CALC-SCNC: 11 MMOL/L — SIGNIFICANT CHANGE UP (ref 7–14)
AST SERPL-CCNC: 22 U/L — SIGNIFICANT CHANGE UP (ref 0–41)
BILIRUB SERPL-MCNC: 0.5 MG/DL — SIGNIFICANT CHANGE UP (ref 0.2–1.2)
BUN SERPL-MCNC: 10 MG/DL — SIGNIFICANT CHANGE UP (ref 10–20)
CALCIUM SERPL-MCNC: 9.5 MG/DL — SIGNIFICANT CHANGE UP (ref 8.4–10.5)
CHLORIDE SERPL-SCNC: 104 MMOL/L — SIGNIFICANT CHANGE UP (ref 98–110)
CO2 SERPL-SCNC: 23 MMOL/L — SIGNIFICANT CHANGE UP (ref 17–32)
CREAT SERPL-MCNC: 0.8 MG/DL — SIGNIFICANT CHANGE UP (ref 0.7–1.5)
EGFR: 86 ML/MIN/1.73M2 — SIGNIFICANT CHANGE UP
GLUCOSE SERPL-MCNC: 89 MG/DL — SIGNIFICANT CHANGE UP (ref 70–99)
HCT VFR BLD CALC: 37.6 % — SIGNIFICANT CHANGE UP (ref 37–47)
HGB BLD-MCNC: 12.5 G/DL — SIGNIFICANT CHANGE UP (ref 12–16)
MCHC RBC-ENTMCNC: 30.2 PG — SIGNIFICANT CHANGE UP (ref 27–31)
MCHC RBC-ENTMCNC: 33.2 G/DL — SIGNIFICANT CHANGE UP (ref 32–37)
MCV RBC AUTO: 90.8 FL — SIGNIFICANT CHANGE UP (ref 81–99)
NRBC # BLD: 0 /100 WBCS — SIGNIFICANT CHANGE UP (ref 0–0)
PLATELET # BLD AUTO: 196 K/UL — SIGNIFICANT CHANGE UP (ref 130–400)
PMV BLD: 9.9 FL — SIGNIFICANT CHANGE UP (ref 7.4–10.4)
POTASSIUM SERPL-MCNC: 4 MMOL/L — SIGNIFICANT CHANGE UP (ref 3.5–5)
POTASSIUM SERPL-SCNC: 4 MMOL/L — SIGNIFICANT CHANGE UP (ref 3.5–5)
PROT SERPL-MCNC: 6.6 G/DL — SIGNIFICANT CHANGE UP (ref 6–8)
RBC # BLD: 4.14 M/UL — LOW (ref 4.2–5.4)
RBC # FLD: 13.2 % — SIGNIFICANT CHANGE UP (ref 11.5–14.5)
SODIUM SERPL-SCNC: 138 MMOL/L — SIGNIFICANT CHANGE UP (ref 135–146)
TROPONIN T, HIGH SENSITIVITY RESULT: <6 NG/L — SIGNIFICANT CHANGE UP (ref 6–13)
WBC # BLD: 5.4 K/UL — SIGNIFICANT CHANGE UP (ref 4.8–10.8)
WBC # FLD AUTO: 5.4 K/UL — SIGNIFICANT CHANGE UP (ref 4.8–10.8)

## 2024-05-07 PROCEDURE — 99223 1ST HOSP IP/OBS HIGH 75: CPT

## 2024-05-07 PROCEDURE — 71045 X-RAY EXAM CHEST 1 VIEW: CPT | Mod: 26

## 2024-05-07 PROCEDURE — 99285 EMERGENCY DEPT VISIT HI MDM: CPT

## 2024-05-07 PROCEDURE — 71260 CT THORAX DX C+: CPT | Mod: MC

## 2024-05-07 PROCEDURE — 75574 CT ANGIO HRT W/3D IMAGE: CPT | Mod: 26,MC

## 2024-05-07 PROCEDURE — 93010 ELECTROCARDIOGRAM REPORT: CPT

## 2024-05-07 PROCEDURE — 71045 X-RAY EXAM CHEST 1 VIEW: CPT

## 2024-05-07 PROCEDURE — 71260 CT THORAX DX C+: CPT | Mod: 26,MC

## 2024-05-07 PROCEDURE — G0378: CPT

## 2024-05-07 PROCEDURE — 84484 ASSAY OF TROPONIN QUANT: CPT

## 2024-05-07 PROCEDURE — 36415 COLL VENOUS BLD VENIPUNCTURE: CPT

## 2024-05-07 PROCEDURE — 85027 COMPLETE CBC AUTOMATED: CPT

## 2024-05-07 PROCEDURE — 75574 CT ANGIO HRT W/3D IMAGE: CPT | Mod: MC

## 2024-05-07 PROCEDURE — 93005 ELECTROCARDIOGRAM TRACING: CPT

## 2024-05-07 PROCEDURE — 80053 COMPREHEN METABOLIC PANEL: CPT

## 2024-05-07 RX ORDER — METOPROLOL TARTRATE 50 MG
100 TABLET ORAL ONCE
Refills: 0 | Status: COMPLETED | OUTPATIENT
Start: 2024-05-07 | End: 2024-05-07

## 2024-05-07 RX ORDER — ATORVASTATIN CALCIUM 80 MG/1
1 TABLET, FILM COATED ORAL
Qty: 30 | Refills: 0
Start: 2024-05-07 | End: 2024-06-05

## 2024-05-07 RX ORDER — ASPIRIN/CALCIUM CARB/MAGNESIUM 324 MG
325 TABLET ORAL ONCE
Refills: 0 | Status: COMPLETED | OUTPATIENT
Start: 2024-05-07 | End: 2024-05-07

## 2024-05-07 RX ADMIN — Medication 100 MILLIGRAM(S): at 11:06

## 2024-05-07 RX ADMIN — Medication 100 MILLIGRAM(S): at 12:22

## 2024-05-07 RX ADMIN — Medication 325 MILLIGRAM(S): at 09:23

## 2024-05-07 NOTE — ED CDU PROVIDER INITIAL DAY NOTE - CLINICAL SUMMARY MEDICAL DECISION MAKING FREE TEXT BOX
57yo F presenting for eval of L sided chest pain with assoc swelling to L neck/clavicle- per pt, swelling since Easter, more recently developed chest pain. No shortness of breath. No fevers/chills, URI sx, abdominal pain, LE pain/swelling. Well appearing, NAD, non toxic. NCAT PERRLA EOMI neck supple non tender normal wob cta bl rrr abdomen s nt nd no rebound no guarding WWPx4 neuro non focal  2+ equal pulses throughout <2sec capillary refill throughout +L neck/clavicular fullness, mild ttp, no overlying skin changes. plan- ccta and ct chest iv con.

## 2024-05-07 NOTE — ED CDU PROVIDER INITIAL DAY NOTE - PROGRESS NOTE DETAILS
All labs and imaging studies reviewed with patient and she has been provided a copy. She does admit to drinking alcohol on a daily basis; hepatitic steatosis was visualized on CT-Scan. CCTA w. CAD-RADS 1 ; will start on statin and ASA 81mg daily. She will follow-up with her cardiologist, Dr. Shi, as scheduled. Additionally, advised to follow-up with her PMD / ENT / additional specialist(s) for left sided neck/chest swelling. Advised that we did not perform a full comprehensive evaluation and she must f/u for further specialist care so as to rule out any cancerous etiology to which she states understanding. Offered to expedite appointment however she states that she will f/u promptly. Strict return precautions advised.

## 2024-05-07 NOTE — ED CDU PROVIDER DISPOSITION NOTE - PATIENT PORTAL LINK FT
You can access the FollowMyHealth Patient Portal offered by [facility] by registering at the following website: [email or web address]. By joining [employer]’s FollowMyHealth portal, you will also be able to view your health information using other applications (apps) compatible with our system.

## 2024-05-07 NOTE — ED PROVIDER NOTE - WR ORDER NAME 1
Returned pt call pt wanting tub chair told her Dr. Monteiro was out of office and it would be tomorrow before she gets message pt verbalized understanding.   Xray Chest 1 View-PORTABLE IMMEDIATE

## 2024-05-07 NOTE — ED PROVIDER NOTE - ATTENDING CONTRIBUTION TO CARE
58-year-old female past med history hypertension, hyperlipidemia diet controlled, hysterectomy presents with left chest pain.  Patient states last week she has been having left-sided pressure and tightness.  Was originally intermittent but now for the last few days has been constant.  No shortness of breath or palpitations.  No fever or chills.  No nausea vomiting abdominal pain.  Patient went to see Dr. Staley with the cardiologist yesterday.  Had a normal EKG.  Was scheduled for an outpatient echo and stress test on the 20th.  Pain persisted and patient was concerned so came in for evaluation.  No leg swelling or pain.    CONSTITUTIONAL: Well-developed; well-nourished; in no acute distress.   SKIN: warm, dry  HEAD: Normocephalic; atraumatic.  EYES: PERRL, EOMI, no conjunctival erythema  ENT: No nasal discharge; airway clear.  NECK: Supple; non tender.  CARD: S1, S2 normal;  Regular rate and rhythm.   RESP: No wheezes, rales or rhonchi.  ABD: soft non tender, non distended, no rebound or guarding  EXT: Normal ROM.  5/5 strength in all 4 extremities. no pedal edema or calf tenderness.   LYMPH: No acute cervical adenopathy.  NEURO: Alert, oriented, grossly unremarkable. neurovascularly intact  PSYCH: Cooperative, appropriate.

## 2024-05-07 NOTE — ED CDU PROVIDER INITIAL DAY NOTE - OBJECTIVE STATEMENT
57 y/o F, PMHx HLD (not on medication), presents to the ED with complaints of intermittent chest discomfort for the past approximately two weeks. She admits to a left sided pain with radiation into left shoulder. She additionally admits to noticing swelling just above her left clavicular region since end of March 24'. She admits to some accompanying tenderness upon palpation; denies any inciting injury/trauma, skin color changes/rash, fever, chills, recent travel, recent immobilization, and lower extremity swelling. She is a former smoker. She denies any significant immediate FHx of heart disease. She saw cardiologist Dr. Shi for her first visit recently (she states that her  57 y/o F, PMHx HLD (not on medication), presents to the ED with complaints of intermittent chest discomfort for the past approximately two weeks. She admits to a left sided pain with radiation into left shoulder. She additionally admits to noticing swelling just above her left clavicular region since end of March 24'. She admits to some accompanying tenderness upon palpation; denies any inciting injury/trauma, skin color changes/rash, fever, chills, recent travel, recent immobilization, and lower extremity swelling. She is a former smoker. She denies any significant immediate FHx of heart disease. She saw cardiologist Dr. Shi for her first visit recently (she states that her  see's him) last week; she is scheduled to have a stress-echo on 5/20/24. 59 y/o F, PMHx HLD (not on medication), presents to the ED with complaints of intermittent chest discomfort for the past approximately two weeks. She admits to a left sided pain with radiation into left shoulder. She additionally admits to noticing swelling just above her left clavicular region since end of March 2024. She admits to some accompanying tenderness upon palpation; denies any inciting injury/trauma, skin color changes/rash, fever, chills, recent travel, recent immobilization, and lower extremity swelling. She is a former smoker. She denies any significant immediate FHx of heart disease. She saw cardiologist Dr. Shi for her first visit recently (she states that her  sees him) last week; she is scheduled to have a stress-echo on 5/20/24.

## 2024-05-07 NOTE — ED CDU PROVIDER DISPOSITION NOTE - NSFOLLOWUPINSTRUCTIONS_ED_ALL_ED_FT

## 2024-05-07 NOTE — ED PROVIDER NOTE - CLINICAL SUMMARY MEDICAL DECISION MAKING FREE TEXT BOX
Patient is a 58 y.o. F with history of HTN and HLD and takes only Wellbutrin presents with achy substernal chest pain that radiates to the left neck for 2 weeks. She denies shortness of breath. On physical exam, patient had swollen and tender left cervical lymph nodes. Normal S1 and S2, no murmurs. No JVD and negative hepatojugular reflux test. No lower extremity edema and LE pulses 2+. Patient's EKG was abnormal, cannot rule out infarction. Chest X-ray negative. CBC, CMP, and Troponin ordered, still pending. Patient presents with chest pain for the last 2 weeks which has been getting worse last few days.  Labs EKG x-ray done.  No acute findings.  Troponin negative.  Scheduled for outpatient stress test by her cardiologist.  Patient placed in ops for further cardiac evaluation.

## 2024-05-07 NOTE — ED ADULT NURSE NOTE - OBJECTIVE STATEMENT
Pt presents to the ED c/o intermittent chest pain x3 weeks. Pt reports having worse chest pain x2 days.

## 2024-05-07 NOTE — ED CDU PROVIDER DISPOSITION NOTE - PROVIDER TOKENS
PROVIDER:[TOKEN:[[id number]],FOLLOWUP:[1-3 Days]],PROVIDER:[TOKEN:[[id number]],FOLLOWUP:[1-3 Days]],PROVIDER:[TOKEN:[[id number]],FOLLOWUP:[Routine]]

## 2024-05-07 NOTE — ED CDU PROVIDER DISPOSITION NOTE - CARE PROVIDER_API CALL
Oscar Irizarry  Internal Medicine  [address] [place] [address]  Phone: [phone]  Fax: [phone]  Follow Up Time: 1-3 Days    Richard Shi  Interventional Cardiology  [address] [place] [address]  Phone: [phone]  Fax: [phone]  Follow Up Time: 1-3 Days    Xavier Crockett  Otolaryngology  [address] [place] [address]  Phone: [phone]  Fax: [phone]  Follow Up Time: Routine

## 2024-05-07 NOTE — ED CDU PROVIDER DISPOSITION NOTE - CLINICAL COURSE
pt presenting for eval of cp. labs ekg imaging reviewed. Aware of all results, given a copy of all available results, comfortable with discharge and follow-up outpatient, strict return precautions given. Endorses understanding of all of this and aware that they can return at any time for new or concerning symptoms. No further questions or concerns at this time

## 2024-05-07 NOTE — ED PROVIDER NOTE - EKG/XRAY ADDITIONAL INFORMATION
ED EKG: my independent interpretation - Dr. Hurst: [NSR at 70,  nl intervals, no ST elevation]  ED CXR film prelim, my independent interpretation - Dr. Hurst: [No PTX, No infiltrates, No Free air]

## 2024-05-07 NOTE — ED CDU PROVIDER INITIAL DAY NOTE - MUSCULOSKELETAL, MLM
+ small area of fullness just above left clavicular region; spine appears normal, range of motion is not limited, no muscle or joint tenderness

## 2024-05-07 NOTE — ED PROVIDER NOTE - OBJECTIVE STATEMENT
Patient is a 58 year old F complaining of achy, throbbing chest pain for 2 weeks. Pain is substernal and radiating to left shoulder and neck. Patient reports pain has been on and off but became constant for the past 3 days. She reports no precipitating factors. She Patient reports cardiac evaluation yesterday and has scheduled echocardiogram on May 20th.

## 2024-05-07 NOTE — ED CDU PROVIDER DISPOSITION NOTE - CARE PROVIDERS DIRECT ADDRESSES
[email or web address]

## 2024-10-01 ENCOUNTER — APPOINTMENT (OUTPATIENT)
Dept: ORTHOPEDIC SURGERY | Facility: CLINIC | Age: 59
End: 2024-10-01
Payer: OTHER MISCELLANEOUS

## 2024-10-01 VITALS — HEIGHT: 63 IN | WEIGHT: 180 LBS | BODY MASS INDEX: 31.89 KG/M2

## 2024-10-01 DIAGNOSIS — S80.02XA CONTUSION OF LEFT KNEE, INITIAL ENCOUNTER: ICD-10-CM

## 2024-10-01 PROCEDURE — 99203 OFFICE O/P NEW LOW 30 MIN: CPT | Mod: ACP

## 2024-10-01 PROCEDURE — 73562 X-RAY EXAM OF KNEE 3: CPT | Mod: LT

## 2024-10-01 NOTE — IMAGING
[de-identified] : On examination of the left knee superficial abrasion on the anterior aspect of knee is noted.  No signs of infection or drainage.  Patient is able to straight leg raise.  She is able to actively flex and extend her knee with discomfort.  She has tenderness to palpation along the inferior pole patella into the patellar tendon and tibial tubercle.  No tenderness over the quadricep tendon.  Tenderness along the medial joint line.  No tenderness along the lateral joint line.  Pain with Bradley's.  Calf is soft nontender.  X-ray left knee in the office today 3 views AP, lateral, oblique no obvious fracture or dislocation patellofemoral degenerative change

## 2024-10-01 NOTE — HISTORY OF PRESENT ILLNESS
[de-identified] : 59-year-old female comes in today for an evaluation of her left knee pain and injury that occurred on October 1st.  Patient had a fall on concrete curb landing onto the knee.  She works as a  at [employer].

## 2024-10-01 NOTE — ASSESSMENT
[FreeTextEntry1] : Recommending rest, ice, anti-inflammatory, local wound care.  Patient requesting MRI.  Will put in order rule out internal derangement.  Follow-up after MRI is completed.  Mild temporary disability patient plans on returning back to work tomorrow full duty.  She works as a .  Follow-up after MRI is completed.

## 2024-10-09 ENCOUNTER — APPOINTMENT (OUTPATIENT)
Dept: MRI IMAGING | Facility: CLINIC | Age: 59
End: 2024-10-09

## 2024-10-28 ENCOUNTER — APPOINTMENT (OUTPATIENT)
Dept: ORTHOPEDIC SURGERY | Facility: CLINIC | Age: 59
End: 2024-10-28
Payer: OTHER MISCELLANEOUS

## 2024-10-28 DIAGNOSIS — S83.272D COMPLEX TEAR OF LATERAL MENISCUS, CURRENT INJURY, LEFT KNEE, SUBSEQUENT ENCOUNTER: ICD-10-CM

## 2024-10-28 PROCEDURE — 99204 OFFICE O/P NEW MOD 45 MIN: CPT

## 2024-10-28 RX ORDER — MELOXICAM 15 MG/1
15 TABLET ORAL DAILY
Qty: 30 | Refills: 1 | Status: ACTIVE | COMMUNITY
Start: 2024-10-28 | End: 1900-01-01

## 2024-12-02 ENCOUNTER — NON-APPOINTMENT (OUTPATIENT)
Age: 59
End: 2024-12-02

## 2024-12-02 ENCOUNTER — APPOINTMENT (OUTPATIENT)
Dept: ORTHOPEDIC SURGERY | Facility: CLINIC | Age: 59
End: 2024-12-02
Payer: OTHER MISCELLANEOUS

## 2024-12-02 DIAGNOSIS — S83.232D COMPLEX TEAR OF MEDIAL MENISCUS, CURRENT INJURY, LEFT KNEE, SUBSEQUENT ENCOUNTER: ICD-10-CM

## 2024-12-02 DIAGNOSIS — S83.272D COMPLEX TEAR OF LATERAL MENISCUS, CURRENT INJURY, LEFT KNEE, SUBSEQUENT ENCOUNTER: ICD-10-CM

## 2024-12-02 PROCEDURE — 99214 OFFICE O/P EST MOD 30 MIN: CPT

## 2024-12-04 ENCOUNTER — NON-APPOINTMENT (OUTPATIENT)
Age: 59
End: 2024-12-04

## 2024-12-25 PROBLEM — F10.90 ALCOHOL USE: Status: ACTIVE | Noted: 2021-05-13

## 2024-12-30 ENCOUNTER — RX RENEWAL (OUTPATIENT)
Age: 59
End: 2024-12-30

## 2025-01-27 ENCOUNTER — APPOINTMENT (OUTPATIENT)
Dept: ORTHOPEDIC SURGERY | Facility: CLINIC | Age: 60
End: 2025-01-27
Payer: OTHER MISCELLANEOUS

## 2025-01-27 DIAGNOSIS — S83.232D COMPLEX TEAR OF MEDIAL MENISCUS, CURRENT INJURY, LEFT KNEE, SUBSEQUENT ENCOUNTER: ICD-10-CM

## 2025-01-27 PROCEDURE — 99214 OFFICE O/P EST MOD 30 MIN: CPT

## 2025-01-28 ENCOUNTER — NON-APPOINTMENT (OUTPATIENT)
Age: 60
End: 2025-01-28

## 2025-02-04 ENCOUNTER — APPOINTMENT (OUTPATIENT)
Dept: ORTHOPEDIC SURGERY | Facility: CLINIC | Age: 60
End: 2025-02-04

## 2025-03-06 ENCOUNTER — RX RENEWAL (OUTPATIENT)
Age: 60
End: 2025-03-06

## 2025-03-27 ENCOUNTER — NON-APPOINTMENT (OUTPATIENT)
Age: 60
End: 2025-03-27

## 2025-03-28 NOTE — ASU PATIENT PROFILE, ADULT - MENTAL HEALTH CONDITIONS/SYMPTOMS, PROFILE
Physical Therapy Daily Treatment Note                        [address] [place] [address] Dr. Santamaria [address] [place] [id number]    Patient: Shelley Black   : 1946  Diagnosis/ICD-10 Code:  Knee stiffness, left [M25.662]  Referring practitioner: Jarocho Brenner MD  Date of Initial Visit: Type: THERAPY  Noted: 2025  Today's Date: 3/27/2025  Patient seen for 12 sessions           Subjective   Pt doing well. No new concerns    Objective   See Exercise, Manual, and Modality Logs for complete treatment.     Assessment/Plan     Pt performed all therapeutic exercises with good technique. Pt continues to have limitations with strength and flexibility. Pt will benefit from continued PT to address these limitations.   Discussed continuing PT 1x/wk for 3 more weeks and then discharge to [facility].     Timed:  Therapeutic Exercise:    15     mins  13627;     Neuromuscular Oscar:   14    mins  43745;  Manual Therapy:    10     mins  46352;    Gait Trainin     mins  44091;     Ultrasound:                    0     mins  24636  Therapeutic Activity:     0     mins  35035;  Self Care                       0     mins   51353    Un-timed:  Electrical Stimulation:    0     mins  09897 ( );  Mechanical Traction      0     mins  05275  Dry Needling     0     mins self-pay  Re-Eval                          0     mins  97827         Timed Treatment:   39   mins   Total Treatment:     39   mins    Dante Mena PT  Physical Therapist    Electronically signed 3/28/2025    [place] License:  PT - [id number]  [place] License: PT - [id number]    none

## 2025-05-21 ENCOUNTER — APPOINTMENT (OUTPATIENT)
Dept: ORTHOPEDIC SURGERY | Facility: CLINIC | Age: 60
End: 2025-05-21